# Patient Record
Sex: FEMALE | Race: WHITE | Employment: OTHER | URBAN - METROPOLITAN AREA
[De-identification: names, ages, dates, MRNs, and addresses within clinical notes are randomized per-mention and may not be internally consistent; named-entity substitution may affect disease eponyms.]

---

## 2017-03-09 ENCOUNTER — OFFICE VISIT (OUTPATIENT)
Dept: DERMATOLOGY | Facility: CLINIC | Age: 57
End: 2017-03-09

## 2017-03-09 DIAGNOSIS — L82.0 INFLAMED SEBORRHEIC KERATOSIS: ICD-10-CM

## 2017-03-09 DIAGNOSIS — L60.3 NAIL DYSTROPHY: Primary | ICD-10-CM

## 2017-03-09 DIAGNOSIS — Z12.83 SKIN CANCER SCREENING: ICD-10-CM

## 2017-03-09 DIAGNOSIS — D18.01 CHERRY ANGIOMA: ICD-10-CM

## 2017-03-09 ASSESSMENT — PAIN SCALES - GENERAL: PAINLEVEL: NO PAIN (0)

## 2017-03-09 NOTE — PATIENT INSTRUCTIONS
Cryotherapy    What is it?    Use of a very cold liquid, such as liquid nitrogen, to freeze and destroy abnormal skin cells that need to be removed    What should I expect?    Tenderness and redness    A small blister that might grow and fill with dark purple blood. There may be crusting.    More than one treatment may be needed if the lesions do not go away.    How do I care for the treated area?    Gently wash the area with your hands when bathing.    Use a thin layer of Vaseline to help with healing. You may use a Band-Aid.     The area should heal within 7-10 days and may leave behind a pink or lighter color.     Do not use an antibiotic or Neosporin ointment.     You may take acetaminophen (Tylenol) for pain.     Call your Doctor if you have:    Severe pain    Signs of infection (warmth, redness, cloudy yellow drainage, and or a bad smell)    Questions or concerns    Who should I call with questions?       [facility]: [phone]       [facility]: [phone]       For urgent needs outside of business hours call the [facility] at [phone]        and ask for the dermatology resident on call

## 2017-03-09 NOTE — PROGRESS NOTES
"[facility] Dermatology Note      Dermatology Problem List:  1. Skin cancer screening 3/9/17    CC:   Chief Complaint   Patient presents with     Skin Check     Torie is here for a skin check today. Has a mole by her right ear states \"it has been scabbing over nad getting crusty\". Patient denies any bleeding         Encounter Date: Mar 9, 2017    History of Present Illness:  Ms. Torie Zarco is a 56 year old female who presents as a new patient in self referral for a skin cancer screening. She states she has stayed out of the sun most of her life. She used to get \"heat rash\" from the sun, so this kept her indoors. Today, she is concerned about a flaky itchy spot near her right ear that has been there for 2-3 months. It is not sore and does not bleed. She is also concerned about thick toenails. She wonders if there is anything to do for this or if it is from her shoes. She is feeling well otherwise, without other skin concerns.       Past Medical History:   Patient Active Problem List   Diagnosis     Menopausal syndrome (hot flashes)     Multiple joint pain     Hormone replacement therapy (HRT)     Major depressive disorder, recurrent, in full remission (H)     Generalized anxiety disorder     Past Medical History   Diagnosis Date     Acid reflux      BALBIR (generalised anxiety disorder)      Gestational diabetes      MDD (major depressive disorder), recurrent, in full remission (H)      PONV (postoperative nausea and vomiting)      Past Surgical History   Procedure Laterality Date     As hysteros w permanent fallopain implant  2009     Bunionectomy  2012     left     Colonoscopy  2007     normal     Hand surgery  2004     right hand fx of ring finger      Gyn surgery       Essure procedure     Orthopedic surgery       hand surgery right     Orthopedic surgery       bunionectomy left foot     Dilation and curettage, hysteroscopy diagnostic, combined N/A 10/26/2016     Procedure: COMBINED DILATION AND " CURETTAGE, HYSTEROSCOPY DIAGNOSTIC;  Surgeon: Zainab Freire MD;  Location: [facility] OR       Social History:  The patient used to be be a professor, 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  The patient denies use of tanning beds.    Family History:  There is a family history skin cancer in the patient's father, on his face.  Family history of rheumatoid arthritis.     Medications:  Current Outpatient Prescriptions   Medication Sig Dispense Refill     ibuprofen (ADVIL,MOTRIN) 600 MG tablet Take 1 tablet (600 mg) by mouth every 6 hours as needed for pain (mild) 30 tablet 0     lamoTRIgine (LAMICTAL) 200 MG tablet Take 300 mg by mouth daily        omeprazole (PRILOSEC) 20 MG capsule Take 20 mg by mouth 2 times daily        Simethicone 125 MG TABS Take 125 mg by mouth as needed        pseudoePHEDrine (SUDAFED) 30 MG tablet Take 30 mg by mouth every 4 hours as needed        diphenhydrAMINE (BENADRYL) 25 MG capsule Take 50 mg by mouth At Bedtime        Multiple Vitamins-Minerals (CENTRUM SILVER) per tablet Take 1 tablet by mouth       No Known Allergies      Review of Systems:  -Skin/Heme New Pt: The patient denies frequent sun exposure. The patient denies excessive scarring or problems healing except as per HPI. The patient denies excessive bleeding.  -Constitutional: The patient denies fatigue, fevers, chills, unintended weight loss, and night sweats.  -HEENT: Patient denies nonhealing oral sores.  -Skin: As above in HPI. No additional skin concerns.    Physical exam:  Vitals: LMP 09/15/2016 (Approximate)  GEN: This is a well developed, well-nourished female in no acute distress, in a pleasant mood.    SKIN: Full skin, which includes the head/face, both arms, chest, back, abdomen,both legs, genitalia and/or groin buttocks, digits and/or nails, was examined.  There is a 3 mm tan stuck on papule on an erythematous base on the right preauricular cheek.   There are hypertrophic nail plates noted to most toes. Mild  subungual debris.   -There are bright red some shaped papules scattered on the trunk.   -Few regular brown pigmented macules and papules are identified on the trunk.   -No other lesions of concern on areas examined.     Impression/Plan:  1. Seborrheic keratosis, inflammed    Cryotherapy procedure note: After verbal consent and discussion of risks and benefits including but no limited to dyspigmentation/scar, blister, and pain, one was treated with 1-2mm freeze border for 2 cycles with liquid nitrogen. Post cryotherapy instructions were provided.     Return if this has not resolved or worsening.     2. Nail dystrophy,  Nail culture and nail clipping sent for pathology.   Will notify patient with results.       3. Cherry angioma(s)    Benign nature was discussed. No further intervention required at this time.       4. Few clinically benign nevi on the trunk     ABCDs of melanoma were discussed and self skin checks were advised.      Sun precaution was advised including the use of sun screens of SPF 30 or higher, sun protective clothing, and avoidance of tanning beds.        Staff Involved:  Staff Only    All risks, benefits and alternatives were discussed with patient.  Patient is in agreement and understands the assessment and plan.  All questions were answered.  Sun Screen Education was given.   Return to Clinic as needed per nail clipping otherwise a skin check every 2-3 years, sooner per concerns.  Krystina Gallardo PA-C

## 2017-03-09 NOTE — MR AVS SNAPSHOT
After Visit Summary   3/9/2017    Torie Zarco    MRN: [id number]           Patient Information     Date Of Birth          1960        Visit Information        Provider Department      3/9/2017 1:30 PM Krystina Gallardo PA-C [facility] Dermatology        Today's Diagnoses     Nail dystrophy    -  1      Care Instructions    Cryotherapy    What is it?    Use of a very cold liquid, such as liquid nitrogen, to freeze and destroy abnormal skin cells that need to be removed    What should I expect?    Tenderness and redness    A small blister that might grow and fill with dark purple blood. There may be crusting.    More than one treatment may be needed if the lesions do not go away.    How do I care for the treated area?    Gently wash the area with your hands when bathing.    Use a thin layer of Vaseline to help with healing. You may use a Band-Aid.     The area should heal within 7-10 days and may leave behind a pink or lighter color.     Do not use an antibiotic or Neosporin ointment.     You may take acetaminophen (Tylenol) for pain.     Call your Doctor if you have:    Severe pain    Signs of infection (warmth, redness, cloudy yellow drainage, and or a bad smell)    Questions or concerns    Who should I call with questions?       [facility]: [phone]       [facility]: [phone]       For urgent needs outside of business hours call the [facility] at [phone]        and ask for the dermatology resident on call          Follow-ups after your visit        Who to contact     Please call your clinic at [phone] to:    Ask questions about your health    Make or cancel appointments    Discuss your medicines    Learn about your test results    Speak to your doctor   If you have compliments or concerns about an experience at your clinic, or if you wish to file a complaint, please contact [facility]  Physicians Patient Relations at [phone] or email us at [email or web address]         Additional Information About Your Visit        [employer] Information     [employer] gives you secure access to your electronic health record. If you see a primary care provider, you can also send messages to your care team and make appointments. If you have questions, please call your primary care clinic.  If you do not have a primary care provider, please call [phone] and they will assist you.      [employer] is an electronic gateway that provides easy, online access to your medical records. With [employer], you can request a clinic appointment, read your test results, renew a prescription or communicate with your care team.     To access your existing account, please contact your [facility] or call [phone] for assistance.        Care EveryWhere ID     This is your Care EveryWhere ID. This could be used by other organizations to access your [place] medical records  [id number]        Your Vitals Were     Last Period                   09/15/2016 (Approximate)            Blood Pressure from Last 3 Encounters:   11/16/16 116/74   10/26/16 111/56   10/24/16 129/81    Weight from Last 3 Encounters:   11/16/16 79.4 kg (175 lb)   10/26/16 80.1 kg (176 lb 9.4 oz)   10/24/16 78.9 kg (174 lb)              We Performed the Following     Fungus skin hair nail culture     Surgical pathology exam        Primary Care Provider Office Phone # Fax #    Anna Ospina MD PhD [phone]        PHYSICIANS [address] [address]        Thank you!     Thank you for choosing [facility] DERMATOLOGY  for your care. Our goal is always to provide you with excellent care. Hearing back from our patients is one way we can continue to improve our services. Please take a few minutes to complete the written survey that you may receive in the mail after your visit with us.  Thank you!             Your Updated Medication List - Protect others around you: Learn how to safely use, store and throw away your medicines at www.disposemymeds.org.          This list is accurate as of: 3/9/17  2:23 PM.  Always use your most recent med list.                   Brand Name Dispense Instructions for use    CENTRUM SILVER per tablet      Take 1 tablet by mouth       diphenhydrAMINE 25 MG capsule    BENADRYL     Take 50 mg by mouth At Bedtime       ibuprofen 600 MG tablet    ADVIL/MOTRIN    30 tablet    Take 1 tablet (600 mg) by mouth every 6 hours as needed for pain (mild)       lamoTRIgine 200 MG tablet    LaMICtal     Take 300 mg by mouth daily       omeprazole 20 MG CR capsule    priLOSEC     Take 20 mg by mouth 2 times daily       pseudoePHEDrine 30 MG tablet    SUDAFED     Take 30 mg by mouth every 4 hours as needed       Simethicone 125 MG Tabs      Take 125 mg by mouth as needed

## 2017-03-09 NOTE — NURSING NOTE
"Chief Complaint   Patient presents with     Skin Check     Torie is here for a skin check today. Has a mole by her right ear states \"it has been scabbing over nad getting crusty\". Patient denies any bleeding     Vickey Vaca LPN    "

## 2017-03-09 NOTE — LETTER
"3/9/2017       RE: Torie Zarco  [address]   [place] [address]     Dear Colleague,    Thank you for referring your patient, Torie Zarco, to the [facility] DERMATOLOGY at [facility]. Please see a copy of my visit note below.    [facility] Dermatology Note      Dermatology Problem List:  1. Skin cancer screening 3/9/17    CC:   Chief Complaint   Patient presents with     Skin Check     Torie is here for a skin check today. Has a mole by her right ear states \"it has been scabbing over nad getting crusty\". Patient denies any bleeding         Encounter Date: Mar 9, 2017    History of Present Illness:  Ms. Torie Zarco is a 56 year old female who presents as a new patient in self referral for a skin cancer screening. She states she has stayed out of the sun most of her life. She used to get \"heat rash\" from the sun, so this kept her indoors. Today, she is concerned about a flaky itchy spot near her right ear that has been there for 2-3 months. It is not sore and does not bleed. She is also concerned about thick toenails. She wonders if there is anything to do for this or if it is from her shoes. She is feeling well otherwise, without other skin concerns.       Past Medical History:   Patient Active Problem List   Diagnosis     Menopausal syndrome (hot flashes)     Multiple joint pain     Hormone replacement therapy (HRT)     Major depressive disorder, recurrent, in full remission (H)     Generalized anxiety disorder     Past Medical History   Diagnosis Date     Acid reflux      BALBIR (generalised anxiety disorder)      Gestational diabetes      MDD (major depressive disorder), recurrent, in full remission (H)      PONV (postoperative nausea and vomiting)      Past Surgical History   Procedure Laterality Date     As hysteros w permanent fallopain implant  2009     Bunionectomy  2012     left     Colonoscopy  2007     normal     Hand surgery  2004     right hand fx " of ring finger      Gyn surgery       Essure procedure     Orthopedic surgery       hand surgery right     Orthopedic surgery       bunionectomy left foot     Dilation and curettage, hysteroscopy diagnostic, combined N/A 10/26/2016     Procedure: COMBINED DILATION AND CURETTAGE, HYSTEROSCOPY DIAGNOSTIC;  Surgeon: Zainab Freire MD;  Location: [facility] OR       Social History:  The patient used to be be a professor, 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  The patient denies use of tanning beds.    Family History:  There is a family history skin cancer in the patient's father, on his face.  Family history of rheumatoid arthritis.     Medications:  Current Outpatient Prescriptions   Medication Sig Dispense Refill     ibuprofen (ADVIL,MOTRIN) 600 MG tablet Take 1 tablet (600 mg) by mouth every 6 hours as needed for pain (mild) 30 tablet 0     lamoTRIgine (LAMICTAL) 200 MG tablet Take 300 mg by mouth daily        omeprazole (PRILOSEC) 20 MG capsule Take 20 mg by mouth 2 times daily        Simethicone 125 MG TABS Take 125 mg by mouth as needed        pseudoePHEDrine (SUDAFED) 30 MG tablet Take 30 mg by mouth every 4 hours as needed        diphenhydrAMINE (BENADRYL) 25 MG capsule Take 50 mg by mouth At Bedtime        Multiple Vitamins-Minerals (CENTRUM SILVER) per tablet Take 1 tablet by mouth       No Known Allergies      Review of Systems:  -Skin/Heme New Pt: The patient denies frequent sun exposure. The patient denies excessive scarring or problems healing except as per HPI. The patient denies excessive bleeding.  -Constitutional: The patient denies fatigue, fevers, chills, unintended weight loss, and night sweats.  -HEENT: Patient denies nonhealing oral sores.  -Skin: As above in HPI. No additional skin concerns.    Physical exam:  Vitals: LMP 09/15/2016 (Approximate)  GEN: This is a well developed, well-nourished female in no acute distress, in a pleasant mood.    SKIN: Full skin, which includes the head/face,  both arms, chest, back, abdomen,both legs, genitalia and/or groin buttocks, digits and/or nails, was examined.  There is a 3 mm tan stuck on papule on an erythematous base on the right preauricular cheek.   There are hypertrophic nail plates noted to most toes. Mild subungual debris.   -There are bright red some shaped papules scattered on the trunk.   -Few regular brown pigmented macules and papules are identified on the trunk.   -No other lesions of concern on areas examined.     Impression/Plan:  1. Seborrheic keratosis, inflammed    Cryotherapy procedure note: After verbal consent and discussion of risks and benefits including but no limited to dyspigmentation/scar, blister, and pain, one was treated with 1-2mm freeze border for 2 cycles with liquid nitrogen. Post cryotherapy instructions were provided.     Return if this has not resolved or worsening.     2. Nail dystrophy,  Nail culture and nail clipping sent for pathology.   Will notify patient with results.       3. Cherry angioma(s)    Benign nature was discussed. No further intervention required at this time.       4. Few clinically benign nevi on the trunk     ABCDs of melanoma were discussed and self skin checks were advised.      Sun precaution was advised including the use of sun screens of SPF 30 or higher, sun protective clothing, and avoidance of tanning beds.        Staff Involved:  Staff Only    All risks, benefits and alternatives were discussed with patient.  Patient is in agreement and understands the assessment and plan.  All questions were answered.  Sun Screen Education was given.   Return to Clinic as needed per nail clipping otherwise a skin check every 2-3 years, sooner per concerns.  Krystina Gallardo PA-C

## 2017-03-13 LAB — COPATH REPORT: NORMAL

## 2017-04-06 LAB
BACTERIA SPEC CULT: NORMAL
MICRO REPORT STATUS: NORMAL
SPECIMEN SOURCE: NORMAL

## 2018-05-30 ASSESSMENT — ENCOUNTER SYMPTOMS
MYALGIAS: 1
TASTE DISTURBANCE: 0
WEIGHT LOSS: 0
FATIGUE: 1
HALLUCINATIONS: 0
NECK PAIN: 1
WEIGHT GAIN: 1
SNORES LOUDLY: 0
PANIC: 0
POLYPHAGIA: 0
FEVER: 0
POSTURAL DYSPNEA: 0
BACK PAIN: 1
MUSCLE WEAKNESS: 0
TROUBLE SWALLOWING: 0
MUSCLE CRAMPS: 0
SINUS CONGESTION: 1
NECK MASS: 0
ALTERED TEMPERATURE REGULATION: 1
DYSPNEA ON EXERTION: 0
STIFFNESS: 1
COUGH DISTURBING SLEEP: 1
DECREASED CONCENTRATION: 0
SORE THROAT: 0
DECREASED APPETITE: 0
DEPRESSION: 0
JOINT SWELLING: 1
NIGHT SWEATS: 1
SPUTUM PRODUCTION: 1
SMELL DISTURBANCE: 0
CHILLS: 0
SINUS PAIN: 1
NERVOUS/ANXIOUS: 0
INSOMNIA: 1
ARTHRALGIAS: 1
COUGH: 1
INCREASED ENERGY: 0
WHEEZING: 0
HEMOPTYSIS: 0
HOARSE VOICE: 1
SHORTNESS OF BREATH: 0
POLYDIPSIA: 0

## 2018-05-30 ASSESSMENT — ANXIETY QUESTIONNAIRES
4. TROUBLE RELAXING: SEVERAL DAYS
1. FEELING NERVOUS, ANXIOUS, OR ON EDGE: SEVERAL DAYS
6. BECOMING EASILY ANNOYED OR IRRITABLE: NOT AT ALL
3. WORRYING TOO MUCH ABOUT DIFFERENT THINGS: NOT AT ALL
7. FEELING AFRAID AS IF SOMETHING AWFUL MIGHT HAPPEN: NOT AT ALL
7. FEELING AFRAID AS IF SOMETHING AWFUL MIGHT HAPPEN: NOT AT ALL
GAD7 TOTAL SCORE: 2
GAD7 TOTAL SCORE: 2
5. BEING SO RESTLESS THAT IT IS HARD TO SIT STILL: NOT AT ALL
2. NOT BEING ABLE TO STOP OR CONTROL WORRYING: NOT AT ALL

## 2018-05-31 ASSESSMENT — ANXIETY QUESTIONNAIRES: GAD7 TOTAL SCORE: 2

## 2018-06-06 ENCOUNTER — OFFICE VISIT (OUTPATIENT)
Dept: INTERNAL MEDICINE | Facility: CLINIC | Age: 58
End: 2018-06-06
Attending: INTERNAL MEDICINE
Payer: COMMERCIAL

## 2018-06-06 VITALS
HEART RATE: 112 BPM | HEIGHT: 65 IN | DIASTOLIC BLOOD PRESSURE: 85 MMHG | SYSTOLIC BLOOD PRESSURE: 126 MMHG | BODY MASS INDEX: 29.82 KG/M2 | WEIGHT: 179 LBS

## 2018-06-06 DIAGNOSIS — M19.041 PRIMARY OSTEOARTHRITIS OF BOTH HANDS: Primary | ICD-10-CM

## 2018-06-06 DIAGNOSIS — M19.042 PRIMARY OSTEOARTHRITIS OF BOTH HANDS: Primary | ICD-10-CM

## 2018-06-06 DIAGNOSIS — Z29.9 PREVENTIVE MEASURE: ICD-10-CM

## 2018-06-06 DIAGNOSIS — N95.1 MENOPAUSAL SYNDROME (HOT FLASHES): ICD-10-CM

## 2018-06-06 DIAGNOSIS — F33.42 MAJOR DEPRESSIVE DISORDER, RECURRENT, IN FULL REMISSION (H): ICD-10-CM

## 2018-06-06 DIAGNOSIS — K21.9 GERD WITHOUT ESOPHAGITIS: ICD-10-CM

## 2018-06-06 PROCEDURE — G0463 HOSPITAL OUTPT CLINIC VISIT: HCPCS | Mod: ZF

## 2018-06-06 ASSESSMENT — ENCOUNTER SYMPTOMS
MEMORY LOSS: 0
DECREASED APPETITE: 0
SMELL DISTURBANCE: 0
LOSS OF CONSCIOUSNESS: 0
HYPERTENSION: 0
HOARSE VOICE: 1
NAIL CHANGES: 0
POLYDIPSIA: 0
MUSCLE CRAMPS: 0
EYE PAIN: 0
HEMOPTYSIS: 0
TROUBLE SWALLOWING: 0
HEADACHES: 0
DIARRHEA: 0
DYSPNEA ON EXERTION: 0
STIFFNESS: 1
PALPITATIONS: 0
RECTAL PAIN: 0
SPEECH CHANGE: 0
INCREASED ENERGY: 0
MUSCLE WEAKNESS: 0
TREMORS: 0
BLOATING: 0
VOMITING: 0
FATIGUE: 1
EXERCISE INTOLERANCE: 0
SNORES LOUDLY: 0
DISTURBANCES IN COORDINATION: 0
WEAKNESS: 0
INSOMNIA: 1
EYE IRRITATION: 0
SEIZURES: 0
TACHYCARDIA: 0
CONSTIPATION: 0
BACK PAIN: 1
COUGH: 1
WEIGHT LOSS: 0
RECTAL BLEEDING: 0
POSTURAL DYSPNEA: 0
NERVOUS/ANXIOUS: 0
ARTHRALGIAS: 1
NAUSEA: 0
BRUISES/BLEEDS EASILY: 0
POOR WOUND HEALING: 0
BOWEL INCONTINENCE: 0
SINUS CONGESTION: 1
HOT FLASHES: 0
LEG SWELLING: 0
DYSURIA: 0
BLOOD IN STOOL: 0
SPUTUM PRODUCTION: 1
BREAST MASS: 0
NECK MASS: 0
MYALGIAS: 1
TASTE DISTURBANCE: 0
HEMATURIA: 0
EYE WATERING: 0
HALLUCINATIONS: 0
SORE THROAT: 0
DEPRESSION: 0
FLANK PAIN: 0
NUMBNESS: 0
TINGLING: 0
NECK PAIN: 1
SINUS PAIN: 1
LEG PAIN: 0
ALTERED TEMPERATURE REGULATION: 1
DIZZINESS: 0
SKIN CHANGES: 0
WHEEZING: 0
JAUNDICE: 0
JOINT SWELLING: 1
SYNCOPE: 0
ABDOMINAL PAIN: 0
HEARTBURN: 0
DECREASED LIBIDO: 0
HYPOTENSION: 0
SHORTNESS OF BREATH: 0
ORTHOPNEA: 0
WEIGHT GAIN: 1
EYE REDNESS: 0
PARALYSIS: 0
NIGHT SWEATS: 1
EXTREMITY NUMBNESS: 0
BREAST PAIN: 0
PANIC: 0
DIFFICULTY URINATING: 0
DECREASED CONCENTRATION: 0
POLYPHAGIA: 0
CLAUDICATION: 0
DOUBLE VISION: 0
LIGHT-HEADEDNESS: 0
SLEEP DISTURBANCES DUE TO BREATHING: 0
FEVER: 0
CHILLS: 0
SWOLLEN GLANDS: 0
COUGH DISTURBING SLEEP: 1

## 2018-06-06 ASSESSMENT — PAIN SCALES - GENERAL: PAINLEVEL: NO PAIN (0)

## 2018-06-06 NOTE — MR AVS SNAPSHOT
After Visit Summary   6/6/2018    Torie Zarco    MRN: [id number]           Patient Information     Date Of Birth          1960        Visit Information        Provider Department      6/6/2018 8:20 AM Rea Andrade MD [facility]         Today's Diagnoses     Primary osteoarthritis of both hands    -  1    Preventive measure        Menopausal syndrome (hot flashes)        Major depressive disorder, recurrent, in full remission (H)        GERD without esophagitis           Follow-ups after your visit        Additional Services     SPORTS MEDICINE REFERRAL       Your provider has referred you to:  [facility]: Sports Medicine Clinic [facility] [phone]   [email or web address]    Please be aware that coverage of these services is subject to the terms and limitations of your health insurance plan.  Call member services at your health plan with any benefit or coverage questions.      Please bring the following to your appointment:    >>   Any x-rays, CTs or MRIs which have been performed.  Contact the facility where they were done to arrange for  prior to your scheduled appointment.    >>   List of current medications   >>   This referral request   >>   Any documents/labs given to you for this referral            WEIGHT MANAGEMENT/ [facility] LIFESTYLE PROGRAM REFERRAL       To schedule an appointment, please call the [facility] Sports Medicine Clinic at  [phone] or [facility] at [phone].                  Future tests that were ordered for you today     Open Future Orders        Priority Expected Expires Ordered    Lipid Profile Routine  6/6/2019 6/6/2018    Comprehensive metabolic panel Routine  6/6/2019 6/6/2018    Hepatitis C Screen Reflex to HCV RNA Quant and Genotype Routine  6/6/2019 6/6/2018    TSH with free T4 reflex Routine  6/6/2019 6/6/2018    Rheumatoid factor Routine  6/6/2019 6/6/2018    RBC Sedimentation Rate  "Routine  6/6/2019 6/6/2018    C-Reactive Protein, Inflammatory Routine  6/6/2019 6/6/2018    XR Hand Bilateral 2 Views Routine  6/6/2019 6/6/2018    SPORTS MEDICINE REFERRAL Routine  6/6/2019 6/6/2018            Who to contact     Please call your clinic at [phone] to:    Ask questions about your health    Make or cancel appointments    Discuss your medicines    Learn about your test results    Speak to your doctor            Additional Information About Your Visit        [employer] Information     [employer] gives you secure access to your electronic health record. If you see a primary care provider, you can also send messages to your care team and make appointments. If you have questions, please call your primary care clinic.  If you do not have a primary care provider, please call [phone] and they will assist you.      [employer] is an electronic gateway that provides easy, online access to your medical records. With [employer], you can request a clinic appointment, read your test results, renew a prescription or communicate with your care team.     To access your existing account, please contact your [facility] or call [phone] for assistance.        Care EveryWhere ID     This is your Care EveryWhere ID. This could be used by other organizations to access your [place] medical records  [id number]        Your Vitals Were     Pulse Height Last Period BMI (Body Mass Index)          112 1.651 m (5' 5\") 09/15/2016 (Approximate) 29.79 kg/m2         Blood Pressure from Last 3 Encounters:   06/06/18 126/85   11/16/16 116/74   10/26/16 111/56    Weight from Last 3 Encounters:   06/06/18 81.2 kg (179 lb)   11/16/16 79.4 kg (175 lb)   10/26/16 80.1 kg (176 lb 9.4 oz)              We Performed the Following     WEIGHT MANAGEMENT/ P LIFESTYLE PROGRAM REFERRAL        Primary Care Provider Office Phone # Fax #    Anna Ospina MD PhD [phone]       [address] [place] [address] " [facility] [address]  [facility] [address]        Equal Access to Services     CHAI SCHULER : Hadii bernabe hinton bety Archer, waaxda luqadaha, qaybta kaalmanoreen sen, gavin louismatthiaslorene dhaliwal. So [facility] [phone].    ATENCIÓN: Si habla español, tiene a zuñiga disposición servicios gratuitos de asistencia lingüística. Beaname al [phone].    We comply with applicable federal civil rights laws and [place] laws. We do not discriminate on the basis of race, color, national origin, age, disability, sex, sexual orientation, or gender identity.            Thank you!     Thank you for choosing [facility]   for your care. Our goal is always to provide you with excellent care. Hearing back from our patients is one way we can continue to improve our services. Please take a few minutes to complete the written survey that you may receive in the mail after your visit with us. Thank you!             Your Updated Medication List - Protect others around you: Learn how to safely use, store and throw away your medicines at www.disposemymeds.org.          This list is accurate as of 6/6/18  9:38 AM.  Always use your most recent med list.                   Brand Name Dispense Instructions for use Diagnosis    CENTRUM SILVER per tablet      Take 1 tablet by mouth        diphenhydrAMINE 25 MG capsule    BENADRYL     Take 50 mg by mouth At Bedtime        ibuprofen 600 MG tablet    ADVIL/MOTRIN    30 tablet    Take 1 tablet (600 mg) by mouth every 6 hours as needed for pain (mild)    S/P D&C (status post dilation and curettage)       lamoTRIgine 200 MG tablet    LaMICtal     Take 300 mg by mouth daily        omeprazole 20 MG CR capsule    priLOSEC     Take 20 mg by mouth 2 times daily        pseudoePHEDrine 30 MG tablet    SUDAFED     Take 30 mg by mouth every 4 hours as needed        Simethicone 125 MG Tabs      Take 125 mg by mouth as needed

## 2018-06-06 NOTE — NURSING NOTE
Chief Complaint   Patient presents with     Establish Care     General check-up   Lotus Jimenez LPN

## 2018-06-06 NOTE — LETTER
6/6/2018       RE: Torie Zarco  [address]  [place] [address]     Dear Colleague,    Thank you for referring your patient, Torie Zarco, to the [facility]  at [facility]. Please see a copy of my visit note below.    HPI  Patient is here to address several concerns. She reports that she has been dealing with arthritis in her hands. She has noticed that the symptoms have been getting worse. She reports family history of rheumatoid arthritis. She has been waking up wit pain in her fingers. She is concerned about osteoarthritis and possible rheumatoid arthritis. She has been waking up with some stiffness in her joints that can last for up to an hour. She is working with her hands a lot, also had injury to her finger. She has a trigger finger as well.   Patient has been diagnosed with laryngeal reflux, wondering if PPI therapy is the optimal approach.   She needs LFT testing because of lamotrigine therapy. She has family history of severe anxiety and depression, has been followed by Psychiatry and Psychology on the [facility] basic.   Patient reports that her  was diagnosed with glioblastoma multiforme. It has increased her stress level considerably.     Review of Systems     Constitutional:  Positive for weight gain, fatigue, night sweats and recent stressors. Negative for fever, chills, weight loss, decreased appetite, height loss, post-operative complications, incisional pain, hallucinations, increased energy, hyperactivity and confused.   HENT:  Positive for tinnitus, hoarse voice, tooth pain, gum tenderness, bleeding gums, dry mouth, sinus pain and sinus congestion. Negative for ear pain, hearing loss, nosebleeds, trouble swallowing, mouth sores, sore throat, ear discharge, taste disturbance, smell disturbance, hearing aid and neck mass.    Eyes:  Negative for double vision, pain, redness, eye pain, decreased vision, eye watering,  eye bulging, eye dryness, flashing lights, spots, floaters, strabismus, tunnel vision, jaundice and eye irritation.   Respiratory:   Positive for cough, sputum production and cough disturbing sleep. Negative for hemoptysis, shortness of breath, wheezing, sleep disturbances due to breathing, snores loudly, dyspnea on exertion and postural dyspnea.    Cardiovascular:  Negative for chest pain, dyspnea on exertion, palpitations, orthopnea, claudication, leg swelling, fingers/toes turn blue, hypertension, hypotension, syncope, history of heart murmur, chest pain on exertion, chest pain at rest, pacemaker, few scattered varicosities, leg pain, sleep disturbances due to breathing, tachycardia, light-headedness, exercise intolerance and edema.   Gastrointestinal:  Negative for heartburn, nausea, vomiting, abdominal pain, diarrhea, constipation, blood in stool, melena, rectal pain, bloating, hemorrhoids, bowel incontinence, jaundice, rectal bleeding, coffee ground emesis and change in stool.   Genitourinary:  Negative for bladder incontinence, dysuria, urgency, hematuria, flank pain, vaginal discharge, difficulty urinating, genital sores, dyspareunia, decreased libido, nocturia, voiding less frequently, arousal difficulty, abnormal vaginal bleeding, excessive menstruation, menstrual changes, hot flashes, vaginal dryness and postmenopausal bleeding.   Musculoskeletal:  Positive for myalgias, back pain, joint swelling, arthralgias, stiffness and neck pain. Negative for muscle cramps, bone pain, muscle weakness and fracture.   Skin:  Negative for nail changes, itching, poor wound healing, rash, hair changes, skin changes, acne, warts, poor wound healing, scarring, flaky skin, Raynaud's phenomenon, sensitivity to sunlight and skin thickening.   Neurological:  Negative for dizziness, tingling, tremors, speech change, seizures, loss of consciousness, weakness, light-headedness, numbness, headaches, disturbances in coordination,  extremity numbness, memory loss, difficulty walking and paralysis.   Endo/Heme:  Negative for anemia, swollen glands and bruises/bleeds easily.   Psychiatric/Behavioral:  Positive for mood swings. Negative for depression, hallucinations, memory loss, decreased concentration and panic attacks.    Breast:  Negative for breast discharge, breast mass, breast pain and nipple retraction.   Endocrine:  Positive for altered temperature regulation.Negative for polyphagia, polydipsia, unwanted hair growth and change in facial hair.    Current Outpatient Prescriptions   Medication     diphenhydrAMINE (BENADRYL) 25 MG capsule     ibuprofen (ADVIL,MOTRIN) 600 MG tablet     lamoTRIgine (LAMICTAL) 200 MG tablet     Multiple Vitamins-Minerals (CENTRUM SILVER) per tablet     omeprazole (PRILOSEC) 20 MG capsule     pseudoePHEDrine (SUDAFED) 30 MG tablet     Simethicone 125 MG TABS     No current facility-administered medications for this visit.      Past Medical History:   Diagnosis Date     Abnormal Pap smear 2013    Unusually looking cells on uterus, but no cancer on exam     Acid reflux      BALBIR (generalised anxiety disorder)      Gestational diabetes      MDD (major depressive disorder), recurrent, in full remission (H)      PONV (postoperative nausea and vomiting)      Past Surgical History:   Procedure Laterality Date     AS HYSTEROS W PERMANENT FALLOPAIN IMPLANT  2009     BIOPSY  2016    Ck unusual bleeding; no issue discovered     BUNIONECTOMY  2012    left     COLONOSCOPY  2007    normal     DILATION AND CURETTAGE, HYSTEROSCOPY DIAGNOSTIC, COMBINED N/A 10/26/2016    Procedure: COMBINED DILATION AND CURETTAGE, HYSTEROSCOPY DIAGNOSTIC;  Surgeon: Zainab Freire MD;  Location: [facility] OR     GYN SURGERY      Essure procedure     HAND SURGERY  2004    right hand fx of ring finger      ORTHOPEDIC SURGERY      hand surgery right     ORTHOPEDIC SURGERY      bunionectomy left foot     Family History   Problem Relation Age of  "Onset     Anxiety Disorder Mother      OSTEOPOROSIS Mother      Thyroid Disease Father      CEREBROVASCULAR DISEASE Other      TIA and PE     Colon Cancer Paternal Grandmother      1960s - full remission     Coronary Artery Disease Son      CHD - unrepaired aortic stenosis     Coronary Artery Disease Maternal Grandmother      Heart failure - cause of death     Depression Sister      suicide - 1985     Social History     Social History     Marital status:      Spouse name: N/A     Number of children: N/A     Years of education: N/A     Occupational History     Not on file.     Social History Main Topics     Smoking status: Never Smoker     Smokeless tobacco: Never Used     Alcohol use 0.0 oz/week      Comment: light - typically 5-7x/wk (glass of wine in any day)     Drug use: No     Sexual activity: Yes     Partners: Male     Birth control/ protection: Other     Other Topics Concern     Caffeine Concern Yes     1/2 c/day     Sleep Concern Yes     Stress Concern Yes     Exercise Yes     walk daily 3 miles - 5miles     Bike Helmet Yes     Seat Belt Yes     Social History Narrative    PhD in English, recently finished  MS in [place] and preservation     2 childrens (boys).  .  [employer]  - second       Vitals:    06/06/18 0834 06/06/18 0838 06/06/18 0839   BP: 135/81 132/82 126/85   Pulse: 92 90 112   Weight: 81.2 kg (179 lb)     Height: 1.651 m (5' 5\")         Physical Exam   Constitutional: She is oriented to person, place, and time and well-developed, well-nourished, and in no distress.   HENT:   Head: Normocephalic and atraumatic.   Mouth/Throat: Oropharynx is clear and moist.   Eyes: Conjunctivae are normal.   Neck: Normal range of motion. Neck supple.   Cardiovascular: Normal rate.    Pulmonary/Chest: Effort normal.   Musculoskeletal: She exhibits deformity (mild hypertrophy of the DIP joints - bilateral hands. No active synovitis present). She exhibits no edema or tenderness. "   Neurological: She is alert and oriented to person, place, and time.   Skin: Skin is warm and dry.   Psychiatric: Mood, memory, affect and judgment normal.   Vitals reviewed.        Answers for HPI/ROS submitted by the patient on 5/30/2018   BALBIR 7 TOTAL SCORE: 2  PHQ-2 Score: 0    Assessment and plan:  Torie was seen today for establish care.    Diagnoses and all orders for this visit:    Primary osteoarthritis of both hands.  Reviewed symptoms last physical exam findings with the patient. .   Strongly favor osteoarthritis as the most likely etiology, however, given patient history of autoimmune disease in the family, will evaluate for rheumatoid arthritis. Will check rheumatoid factor as well as ESR and CRP.  Patient will also schedule an x-ray to exclude erosive lesions. Referral to sports medicine for further evaluation was given to the patient   -     Rheumatoid factor; Future  -     RBC Sedimentation Rate; Future  -     C-Reactive Protein, Inflammatory; Future  -     XR Hand Bilateral 2 Views; Future  -     SPORTS MEDICINE REFERRAL; Future    Preventive measure.  Reviewed preventive healthcare needs with patient.  Will screen for hepatitis C as well as hyperlipidemia.  Will check fasting lipid panel as well as fasting blood glucose patient has history of gestational diabetes.  Patient will be advised on test results accordingly.  -     Lipid Profile; Future  -     Comprehensive metabolic panel; Future  -     Hepatitis C Screen Reflex to HCV RNA Quant and Genotype; Future  -     TSH with free T4 reflex; Future    Menopausal syndrome (hot flashes).  Patient reports that she does not want to restart hormone replacement therapy.  Discussed lifestyle modifications aimed at management of menopausal symptoms.  Patient will contact the clinic if her symptoms are bothersome enough to change medication.    Major depressive disorder, recurrent, in full remission (H).  Patient is closely followed by psychology as well as  psychiatry.  No medication changes were recommended today.  Discussed lifestyle modifications aimed at control of reflux.  Recommend weight loss, patient was referred to lifestyle modification program    GERD without esophagitis..  Discussed weight loss and some optimal approach to management of reflux.  Patient was referred to weight management for recommendations on lifestyle modifications aimed at weight loss.  -     WEIGHT MANAGEMENT/ P LIFESTYLE PROGRAM REFERRAL    Total time spent 45 minutes.  More than 50% of the time spent with Ms. Zarco on counseling / coordinating her care    Rea Andrade MD

## 2018-06-06 NOTE — PROGRESS NOTES
HPI  Patient is here to address several concerns. She reports that she has been dealing with arthritis in her hands. She has noticed that the symptoms have been getting worse. She reports family history of rheumatoid arthritis. She has been waking up wit pain in her fingers. She is concerned about osteoarthritis and possible rheumatoid arthritis. She has been waking up with some stiffness in her joints that can last for up to an hour. She is working with her hands a lot, also had injury to her finger. She has a trigger finger as well.   Patient has been diagnosed with laryngeal reflux, wondering if PPI therapy is the optimal approach.   She needs LFT testing because of lamotrigine therapy. She has family history of severe anxiety and depression, has been followed by Psychiatry and Psychology on the [facility].   Patient reports that her  was diagnosed with glioblastoma multiforme. It has increased her stress level considerably.     Review of Systems     Constitutional:  Positive for weight gain, fatigue, night sweats and recent stressors. Negative for fever, chills, weight loss, decreased appetite, height loss, post-operative complications, incisional pain, hallucinations, increased energy, hyperactivity and confused.   HENT:  Positive for tinnitus, hoarse voice, tooth pain, gum tenderness, bleeding gums, dry mouth, sinus pain and sinus congestion. Negative for ear pain, hearing loss, nosebleeds, trouble swallowing, mouth sores, sore throat, ear discharge, taste disturbance, smell disturbance, hearing aid and neck mass.    Eyes:  Negative for double vision, pain, redness, eye pain, decreased vision, eye watering, eye bulging, eye dryness, flashing lights, spots, floaters, strabismus, tunnel vision, jaundice and eye irritation.   Respiratory:   Positive for cough, sputum production and cough disturbing sleep. Negative for hemoptysis, shortness of breath, wheezing, sleep disturbances due to breathing, snores  loudly, dyspnea on exertion and postural dyspnea.    Cardiovascular:  Negative for chest pain, dyspnea on exertion, palpitations, orthopnea, claudication, leg swelling, fingers/toes turn blue, hypertension, hypotension, syncope, history of heart murmur, chest pain on exertion, chest pain at rest, pacemaker, few scattered varicosities, leg pain, sleep disturbances due to breathing, tachycardia, light-headedness, exercise intolerance and edema.   Gastrointestinal:  Negative for heartburn, nausea, vomiting, abdominal pain, diarrhea, constipation, blood in stool, melena, rectal pain, bloating, hemorrhoids, bowel incontinence, jaundice, rectal bleeding, coffee ground emesis and change in stool.   Genitourinary:  Negative for bladder incontinence, dysuria, urgency, hematuria, flank pain, vaginal discharge, difficulty urinating, genital sores, dyspareunia, decreased libido, nocturia, voiding less frequently, arousal difficulty, abnormal vaginal bleeding, excessive menstruation, menstrual changes, hot flashes, vaginal dryness and postmenopausal bleeding.   Musculoskeletal:  Positive for myalgias, back pain, joint swelling, arthralgias, stiffness and neck pain. Negative for muscle cramps, bone pain, muscle weakness and fracture.   Skin:  Negative for nail changes, itching, poor wound healing, rash, hair changes, skin changes, acne, warts, poor wound healing, scarring, flaky skin, Raynaud's phenomenon, sensitivity to sunlight and skin thickening.   Neurological:  Negative for dizziness, tingling, tremors, speech change, seizures, loss of consciousness, weakness, light-headedness, numbness, headaches, disturbances in coordination, extremity numbness, memory loss, difficulty walking and paralysis.   Endo/Heme:  Negative for anemia, swollen glands and bruises/bleeds easily.   Psychiatric/Behavioral:  Positive for mood swings. Negative for depression, hallucinations, memory loss, decreased concentration and panic attacks.     Breast:  Negative for breast discharge, breast mass, breast pain and nipple retraction.   Endocrine:  Positive for altered temperature regulation.Negative for polyphagia, polydipsia, unwanted hair growth and change in facial hair.    Current Outpatient Prescriptions   Medication     diphenhydrAMINE (BENADRYL) 25 MG capsule     ibuprofen (ADVIL,MOTRIN) 600 MG tablet     lamoTRIgine (LAMICTAL) 200 MG tablet     Multiple Vitamins-Minerals (CENTRUM SILVER) per tablet     omeprazole (PRILOSEC) 20 MG capsule     pseudoePHEDrine (SUDAFED) 30 MG tablet     Simethicone 125 MG TABS     No current facility-administered medications for this visit.      Past Medical History:   Diagnosis Date     Abnormal Pap smear 2013    Unusually looking cells on uterus, but no cancer on exam     Acid reflux      BALBIR (generalised anxiety disorder)      Gestational diabetes      MDD (major depressive disorder), recurrent, in full remission (H)      PONV (postoperative nausea and vomiting)      Past Surgical History:   Procedure Laterality Date     AS HYSTEROS W PERMANENT FALLOPAIN IMPLANT  2009     BIOPSY  2016    Ck unusual bleeding; no issue discovered     BUNIONECTOMY  2012    left     COLONOSCOPY  2007    normal     DILATION AND CURETTAGE, HYSTEROSCOPY DIAGNOSTIC, COMBINED N/A 10/26/2016    Procedure: COMBINED DILATION AND CURETTAGE, HYSTEROSCOPY DIAGNOSTIC;  Surgeon: Zainab Freire MD;  Location: [facility] OR     GYN SURGERY      Essure procedure     HAND SURGERY  2004    right hand fx of ring finger      ORTHOPEDIC SURGERY      hand surgery right     ORTHOPEDIC SURGERY      bunionectomy left foot     Family History   Problem Relation Age of Onset     Anxiety Disorder Mother      OSTEOPOROSIS Mother      Thyroid Disease Father      CEREBROVASCULAR DISEASE Other      TIA and PE     Colon Cancer Paternal Grandmother      1960s - full remission     Coronary Artery Disease Son      CHD - unrepaired aortic stenosis     Coronary Artery  "Disease Maternal Grandmother      Heart failure - cause of death     Depression Sister      suicide - 1985     Social History     Social History     Marital status:      Spouse name: N/A     Number of children: N/A     Years of education: N/A     Occupational History     Not on file.     Social History Main Topics     Smoking status: Never Smoker     Smokeless tobacco: Never Used     Alcohol use 0.0 oz/week      Comment: light - typically 5-7x/wk (glass of wine in any day)     Drug use: No     Sexual activity: Yes     Partners: Male     Birth control/ protection: Other     Other Topics Concern     Caffeine Concern Yes     1/2 c/day     Sleep Concern Yes     Stress Concern Yes     Exercise Yes     walk daily 3 miles - 5miles     Bike Helmet Yes     Seat Belt Yes     Social History Narrative    PhD in English, recently finished  MS in [place] and preservation     2 childrens (boys).  .  [employer]  - second       Vitals:    06/06/18 0834 06/06/18 0838 06/06/18 0839   BP: 135/81 132/82 126/85   Pulse: 92 90 112   Weight: 81.2 kg (179 lb)     Height: 1.651 m (5' 5\")         Physical Exam   Constitutional: She is oriented to person, place, and time and well-developed, well-nourished, and in no distress.   HENT:   Head: Normocephalic and atraumatic.   Mouth/Throat: Oropharynx is clear and moist.   Eyes: Conjunctivae are normal.   Neck: Normal range of motion. Neck supple.   Cardiovascular: Normal rate.    Pulmonary/Chest: Effort normal.   Musculoskeletal: She exhibits deformity (mild hypertrophy of the DIP joints - bilateral hands. No active synovitis present). She exhibits no edema or tenderness.   Neurological: She is alert and oriented to person, place, and time.   Skin: Skin is warm and dry.   Psychiatric: Mood, memory, affect and judgment normal.   Vitals reviewed.        Answers for HPI/ROS submitted by the patient on 5/30/2018   BALBIR 7 TOTAL SCORE: 2  PHQ-2 Score: 0    Assessment " and plan:  Torie was seen today for establish care.    Diagnoses and all orders for this visit:    Primary osteoarthritis of both hands.  Reviewed symptoms last physical exam findings with the patient. .   Strongly favor osteoarthritis as the most likely etiology, however, given patient history of autoimmune disease in the family, will evaluate for rheumatoid arthritis. Will check rheumatoid factor as well as ESR and CRP.  Patient will also schedule an x-ray to exclude erosive lesions. Referral to sports medicine for further evaluation was given to the patient   -     Rheumatoid factor; Future  -     RBC Sedimentation Rate; Future  -     C-Reactive Protein, Inflammatory; Future  -     XR Hand Bilateral 2 Views; Future  -     SPORTS MEDICINE REFERRAL; Future    Preventive measure.  Reviewed preventive healthcare needs with patient.  Will screen for hepatitis C as well as hyperlipidemia.  Will check fasting lipid panel as well as fasting blood glucose patient has history of gestational diabetes.  Patient will be advised on test results accordingly.  -     Lipid Profile; Future  -     Comprehensive metabolic panel; Future  -     Hepatitis C Screen Reflex to HCV RNA Quant and Genotype; Future  -     TSH with free T4 reflex; Future    Menopausal syndrome (hot flashes).  Patient reports that she does not want to restart hormone replacement therapy.  Discussed lifestyle modifications aimed at management of menopausal symptoms.  Patient will contact the clinic if her symptoms are bothersome enough to change medication.    Major depressive disorder, recurrent, in full remission (H).  Patient is closely followed by psychology as well as psychiatry.  No medication changes were recommended today.  Discussed lifestyle modifications aimed at control of reflux.  Recommend weight loss, patient was referred to lifestyle modification program    GERD without esophagitis..  Discussed weight loss and some optimal approach to management  of reflux.  Patient was referred to weight management for recommendations on lifestyle modifications aimed at weight loss.  -     WEIGHT MANAGEMENT/ P LIFESTYLE PROGRAM REFERRAL    Total time spent 45 minutes.  More than 50% of the time spent with Ms. Zarco on counseling / coordinating her care    Rea Andrade MD

## 2018-06-18 DIAGNOSIS — Z29.9 PREVENTIVE MEASURE: ICD-10-CM

## 2018-06-18 DIAGNOSIS — M19.042 PRIMARY OSTEOARTHRITIS OF BOTH HANDS: ICD-10-CM

## 2018-06-18 DIAGNOSIS — M19.041 PRIMARY OSTEOARTHRITIS OF BOTH HANDS: ICD-10-CM

## 2018-06-18 LAB
ALBUMIN SERPL-MCNC: 3.8 G/DL (ref 3.4–5)
ALP SERPL-CCNC: 84 U/L (ref 40–150)
ALT SERPL W P-5'-P-CCNC: 28 U/L (ref 0–50)
ANION GAP SERPL CALCULATED.3IONS-SCNC: 8 MMOL/L (ref 3–14)
AST SERPL W P-5'-P-CCNC: 18 U/L (ref 0–45)
BILIRUB SERPL-MCNC: 0.3 MG/DL (ref 0.2–1.3)
BUN SERPL-MCNC: 16 MG/DL (ref 7–30)
CALCIUM SERPL-MCNC: 8.3 MG/DL (ref 8.5–10.1)
CHLORIDE SERPL-SCNC: 108 MMOL/L (ref 94–109)
CHOLEST SERPL-MCNC: 195 MG/DL
CO2 SERPL-SCNC: 25 MMOL/L (ref 20–32)
CREAT SERPL-MCNC: 0.84 MG/DL (ref 0.52–1.04)
CRP SERPL-MCNC: <2.9 MG/L (ref 0–8)
ERYTHROCYTE [SEDIMENTATION RATE] IN BLOOD BY WESTERGREN METHOD: 6 MM/H (ref 0–30)
GFR SERPL CREATININE-BSD FRML MDRD: 70 ML/MIN/1.7M2
GLUCOSE SERPL-MCNC: 90 MG/DL (ref 70–99)
HCV AB SERPL QL IA: NONREACTIVE
HDLC SERPL-MCNC: 62 MG/DL
LDLC SERPL CALC-MCNC: 117 MG/DL
NONHDLC SERPL-MCNC: 133 MG/DL
POTASSIUM SERPL-SCNC: 4.1 MMOL/L (ref 3.4–5.3)
PROT SERPL-MCNC: 6.8 G/DL (ref 6.8–8.8)
RHEUMATOID FACT SER NEPH-ACNC: <20 IU/ML (ref 0–20)
SODIUM SERPL-SCNC: 141 MMOL/L (ref 133–144)
TRIGL SERPL-MCNC: 79 MG/DL
TSH SERPL DL<=0.005 MIU/L-ACNC: 3.3 MU/L (ref 0.4–4)

## 2018-06-18 NOTE — TELEPHONE ENCOUNTER
FUTURE VISIT INFORMATION      FUTURE VISIT INFORMATION:    Date: 6/20/18    Time: 5:45    Location:   REFERRAL INFORMATION:    Referring provider: Lupe Tinajero                          Referring providers clinic: [facility]                           Reason for visit/diagnosis: Primary osteoarthritis of both hands        RECORDS STATUS    All Records and Imaging Internal

## 2018-06-20 ENCOUNTER — PRE VISIT (OUTPATIENT)
Dept: ORTHOPEDICS | Facility: CLINIC | Age: 58
End: 2018-06-20

## 2018-06-20 ENCOUNTER — RADIANT APPOINTMENT (OUTPATIENT)
Dept: GENERAL RADIOLOGY | Facility: CLINIC | Age: 58
End: 2018-06-20
Attending: INTERNAL MEDICINE
Payer: COMMERCIAL

## 2018-06-20 ENCOUNTER — OFFICE VISIT (OUTPATIENT)
Dept: ORTHOPEDICS | Facility: CLINIC | Age: 58
End: 2018-06-20
Payer: COMMERCIAL

## 2018-06-20 VITALS — HEIGHT: 65 IN | BODY MASS INDEX: 29.82 KG/M2 | WEIGHT: 179 LBS

## 2018-06-20 DIAGNOSIS — M19.041 PRIMARY OSTEOARTHRITIS OF BOTH HANDS: ICD-10-CM

## 2018-06-20 DIAGNOSIS — M65.342 TRIGGER RING FINGER OF LEFT HAND: Primary | ICD-10-CM

## 2018-06-20 DIAGNOSIS — M79.641 BILATERAL HAND PAIN: ICD-10-CM

## 2018-06-20 DIAGNOSIS — M19.042 PRIMARY OSTEOARTHRITIS OF BOTH HANDS: ICD-10-CM

## 2018-06-20 DIAGNOSIS — M79.641 BILATERAL HAND PAIN: Primary | ICD-10-CM

## 2018-06-20 DIAGNOSIS — M79.642 BILATERAL HAND PAIN: Primary | ICD-10-CM

## 2018-06-20 DIAGNOSIS — M79.642 BILATERAL HAND PAIN: ICD-10-CM

## 2018-06-20 RX ADMIN — DEXAMETHASONE SODIUM PHOSPHATE 2 MG: 4 INJECTION, SOLUTION INTRA-ARTICULAR; INTRALESIONAL; INTRAMUSCULAR; INTRAVENOUS; SOFT TISSUE at 18:11

## 2018-06-20 RX ADMIN — DEXAMETHASONE SODIUM PHOSPHATE 4 MG: 4 INJECTION, SOLUTION INTRA-ARTICULAR; INTRALESIONAL; INTRAMUSCULAR; INTRAVENOUS; SOFT TISSUE at 18:11

## 2018-06-20 RX ADMIN — LIDOCAINE HYDROCHLORIDE 0.5 ML: 10 INJECTION, SOLUTION EPIDURAL; INFILTRATION; INTRACAUDAL; PERINEURAL at 18:11

## 2018-06-20 NOTE — MR AVS SNAPSHOT
"              After Visit Summary   6/20/2018    Torie Zarco    MRN: [id number]           Patient Information     Date Of Birth          1960        Visit Information        Provider Department      6/20/2018 5:45 PM Sandoval Henderson MD [facility]        Today's Diagnoses     Trigger ring finger of left hand    -  1    Primary osteoarthritis of both hands           Follow-ups after your visit        Who to contact     Please call your clinic at [phone] to:    Ask questions about your health    Make or cancel appointments    Discuss your medicines    Learn about your test results    Speak to your doctor            Additional Information About Your Visit        MyChart Information     My Pick Box gives you secure access to your electronic health record. If you see a primary care provider, you can also send messages to your care team and make appointments. If you have questions, please call your primary care clinic.  If you do not have a primary care provider, please call [phone] and they will assist you.      My Pick Box is an electronic gateway that provides easy, online access to your medical records. With My Pick Box, you can request a clinic appointment, read your test results, renew a prescription or communicate with your care team.     To access your existing account, please contact your [facility] or call [phone] for assistance.        Care EveryWhere ID     This is your Care EveryWhere ID. This could be used by other organizations to access your [facility] medical records  [id number]        Your Vitals Were     Height Last Period BMI (Body Mass Index)             5' 5\" (1.651 m) 09/15/2016 (Approximate) 29.79 kg/m2          Blood Pressure from Last 3 Encounters:   06/06/18 126/85   11/16/16 116/74   10/26/16 111/56    Weight from Last 3 Encounters:   06/20/18 179 lb (81.2 kg)   06/06/18 179 lb (81.2 kg)   11/16/16 175 lb (79.4 kg)              We " Performed the Following     Small Joint Injection/Arthrocentesis     SPORTS MEDICINE REFERRAL        Primary Care Provider Office Phone # Fax #    Rea Mikayla Andrade [phone]       [facility] [address]  [facility] [address]        Equal Access to Services     BERTHA HARINI : Hadii bernabe ku hadhaydeeo Soomaali, waaxda luqadaha, qaybta kaalmada adeegyada, waxlaron camacho amadamel kumari rikkilorene lunsford . So [facility] [phone].    ATENCIÓN: Si habla español, tiene a zuñiga disposición servicios gratuitos de asistencia lingüística. Llame al [phone].    We comply with applicable federal civil rights laws and [place] laws. We do not discriminate on the basis of race, color, national origin, age, disability, sex, sexual orientation, or gender identity.            Thank you!     Thank you for choosing [facility]  for your care. Our goal is always to provide you with excellent care. Hearing back from our patients is one way we can continue to improve our services. Please take a few minutes to complete the written survey that you may receive in the mail after your visit with us. Thank you!             Your Updated Medication List - Protect others around you: Learn how to safely use, store and throw away your medicines at www.disposemymeds.org.          This list is accurate as of 6/20/18 11:59 PM.  Always use your most recent med list.                   Brand Name Dispense Instructions for use Diagnosis    CENTRUM SILVER per tablet      Take 1 tablet by mouth        diphenhydrAMINE 25 MG capsule    BENADRYL     Take 50 mg by mouth At Bedtime        ibuprofen 600 MG tablet    ADVIL/MOTRIN    30 tablet    Take 1 tablet (600 mg) by mouth every 6 hours as needed for pain (mild)    S/P D&C (status post dilation and curettage)       lamoTRIgine 200 MG tablet    LaMICtal     Take 300 mg by mouth daily        omeprazole 20 MG CR capsule    priLOSEC     Take 20 mg by mouth 2 times daily         pseudoePHEDrine 30 MG tablet    SUDAFED     Take 30 mg by mouth every 4 hours as needed        Simethicone 125 MG Tabs      Take 125 mg by mouth as needed

## 2018-06-20 NOTE — PROGRESS NOTES
Subjective:   Torie Zarco is a 57 year old female who complains of bilateral hand pain. She notes that she has had aching in her bilateral hands, mostly over the right second through fourth digits at her DIP joints and PIP joints, the left second through fourth digit PIP joints, and the fourth and fifth digit DIP joints.  She has been told she has arthritis of the hands and she does have a family history of rheumatoid arthritis.  She also has left fourth finger or ring finger triggering and locking which can also be painful.  She is not diabetic.       She does note that her  was recently diagnosed with a grade 4 glioblastoma and so she is working through this with him and is under increased stress, and this, she feels, is affecting her hands as well.  She does note that she has some stiffness in her hands in the mornings, but it only lasts less than 30 minutes.     Background:   Date of injury: None   Duration of symptoms: 3 months  Mechanism of Injury: Insidious Onset; Unknown   Aggravating factors: Worse in the AM, grasping, clenching   Relieving Factors: NSAIDs and acetaminophen, heat  Prior Evaluation: Prior Physician Evalutation: Dr. Andrade     PAST MEDICAL, SOCIAL, SURGICAL AND FAMILY HISTORY: She  has a past medical history of Abnormal Pap smear (2013); Acid reflux; BALBIR (generalised anxiety disorder); Gestational diabetes; MDD (major depressive disorder), recurrent, in full remission (H); and PONV (postoperative nausea and vomiting).  She  has a past surgical history that includes HYSTEROS W PERMANENT FALLOPAIN IMPLANT (2009); Bunionectomy (2012); colonoscopy (2007); Hand surgery (2004); GYN surgery; orthopedic surgery; orthopedic surgery; Dilation and curettage, hysteroscopy diagnostic, combined (N/A, 10/26/2016); and biopsy (2016).  Her family history includes Anxiety Disorder in her mother; Cerebrovascular Disease in an other family member; Colon Cancer in her paternal grandmother; Coronary Artery  "Disease in her maternal grandmother and son; Depression in her sister; Osteoperosis in her mother; Thyroid Disease in her father.  She reports that she has never smoked. She has never used smokeless tobacco. She reports that she drinks alcohol. She reports that she does not use illicit drugs.    ALLERGIES: She has No Known Allergies.    CURRENT MEDICATIONS: She has a current medication list which includes the following prescription(s): diphenhydramine, ibuprofen, lamotrigine, centrum silver, omeprazole, pseudoephedrine, and simethicone.     REVIEW OF SYSTEMS: 12 point review of systems is negative except as noted above.     Exam:   Ht 5' 5\" (1.651 m)  Wt 179 lb (81.2 kg)  LMP 09/15/2016 (Approximate)  BMI 29.79 kg/m2      CONSTITUTIONAL: healthy, alert and no distress  HEAD: Normocephalic. No masses, lesions, tenderness or abnormalities  SKIN: no suspicious lesions or rashes  GAIT: normal  NEUROLOGIC: Non-focal  PSYCHIATRIC: affect normal/bright and mentation appears normal.    MUSCULOSKELETAL:   RIGHT HAND:  There is no deformity and no visible swelling.  The MCP joints are nontender and nonswollen.  The thumb CMC articulation is nontender and nonswollen.  On digits 2 through 4, she has mild tenderness with no significant swelling of all the PIP joints and DIP joints.     LEFT HAND:  The MCP joints are nontender and nonswollen.  The thumb CMC articulation is nontender and nonswollen.  On digits 2 through 4, the PIP joints are tender to palpation.  On digits 4 and 5, the DIP joints are tender to palpation.  The left ring finger demonstrates a tender nodule at the flexor tendon at the level of the A1 pulley with the presence of triggering.      IMAGING:  Radiographs of her bilateral hands are obtained and demonstrate polyarticular degenerative changes mostly involving the DIP and PIP joints.  There is no evidence of MCP joint involvement.       ASSESSMENT:  Torie Zarco is a 57-year-old female with bilateral hand " osteoarthritis and a left ring finger trigger finger.       PLAN:  We discussed the use of warm superficial heat to her bilateral hands.  We discussed the use of analgesics such as acetaminophen and ibuprofen.  We discussed the use of topical applications to assist with her pain relief.  With regards to her trigger finger, we discussed options and will proceed with a left ring finger trigger finger injection.  She will return for repeat injection or may want to proceed with surgical release if she does get adequate relief within the next 3-4 weeks.

## 2018-06-20 NOTE — PROGRESS NOTES
Small Joint Injection/Arthrocentesis  Date/Time: 6/20/2018 6:11 PM  Performed by: SHYAM SYKES  Authorized by: SHYAM SYKES     Indications:  Pain  Needle Size:  25 G  Approach:  Volar  Location:  Ring finger  Medications:  4 mg dexamethasone 4 MG/ML; 2 mg dexamethasone 4 MG/ML; 0.5 mL lidocaine (PF) 1 %  Medications comment:  The following medication was given:     MEDICATION:  Dexamethasone  ROUTE: SQ  SITE: Left ring finger  DOSE: 1.5 mL  LOT #: [id number]  : [employer]  EXPIRATION DATE: 07/19  NDC#: 66954-572-82   Was there drug waste? Yes  Amount of drug waste (mL): 0.5.  Reason for waste:  Single use vial      MEDICATION:  Lidocaine without epinephrine  ROUTE: SQ  SITE: Left ring finger  DOSE: 0.5cc  LOT #: [id number]  : [employer]  EXPIRATION DATE: 02/22  NDC#: 34779-446-56   Was there drug waste? Yes  Amount of drug waste (mL): 4.5.  Reason for waste:  Single use vial    Outcome:  Tolerated well, no immediate complications  Procedure discussed: discussed risks, benefits, and alternatives    Consent Given by:  Patient  Timeout: timeout called immediately prior to procedure    Prep: patient was prepped and draped in usual sterile fashion     Left ring finger trigger finger CSI

## 2018-06-20 NOTE — LETTER
6/20/2018      RE: Torie Zarco  [address]  [place] [address]        Subjective:   Torie Zarco is a 57 year old female who complains of bilateral hand pain. She notes that she has had aching in her bilateral hands, mostly over the right second through fourth digits at her DIP joints and PIP joints, the left second through fourth digit PIP joints, and the fourth and fifth digit DIP joints.  She has been told she has arthritis of the hands and she does have a family history of rheumatoid arthritis.  She also has left fourth finger or ring finger triggering and locking which can also be painful.  She is not diabetic.       She does note that her  was recently diagnosed with a grade 4 glioblastoma and so she is working through this with him and is under increased stress, and this, she feels, is affecting her hands as well.  She does note that she has some stiffness in her hands in the mornings, but it only lasts less than 30 minutes.     Background:   Date of injury: None   Duration of symptoms: 3 months  Mechanism of Injury: Insidious Onset; Unknown   Aggravating factors: Worse in the AM, grasping, clenching   Relieving Factors: NSAIDs and acetaminophen, heat  Prior Evaluation: Prior Physician Evalutation: Dr. Andrade     PAST MEDICAL, SOCIAL, SURGICAL AND FAMILY HISTORY: She  has a past medical history of Abnormal Pap smear (2013); Acid reflux; BALBIR (generalised anxiety disorder); Gestational diabetes; MDD (major depressive disorder), recurrent, in full remission (H); and PONV (postoperative nausea and vomiting).  She  has a past surgical history that includes HYSTEROS W PERMANENT FALLOPAIN IMPLANT (2009); Bunionectomy (2012); colonoscopy (2007); Hand surgery (2004); GYN surgery; orthopedic surgery; orthopedic surgery; Dilation and curettage, hysteroscopy diagnostic, combined (N/A, 10/26/2016); and biopsy (2016).  Her family history includes Anxiety Disorder in her mother; Cerebrovascular  "Disease in an other family member; Colon Cancer in her paternal grandmother; Coronary Artery Disease in her maternal grandmother and son; Depression in her sister; Osteoperosis in her mother; Thyroid Disease in her father.  She reports that she has never smoked. She has never used smokeless tobacco. She reports that she drinks alcohol. She reports that she does not use illicit drugs.    ALLERGIES: She has No Known Allergies.    CURRENT MEDICATIONS: She has a current medication list which includes the following prescription(s): diphenhydramine, ibuprofen, lamotrigine, centrum silver, omeprazole, pseudoephedrine, and simethicone.     REVIEW OF SYSTEMS: 12 point review of systems is negative except as noted above.     Exam:   Ht 5' 5\" (1.651 m)  Wt 179 lb (81.2 kg)  LMP 09/15/2016 (Approximate)  BMI 29.79 kg/m2      CONSTITUTIONAL: healthy, alert and no distress  HEAD: Normocephalic. No masses, lesions, tenderness or abnormalities  SKIN: no suspicious lesions or rashes  GAIT: normal  NEUROLOGIC: Non-focal  PSYCHIATRIC: affect normal/bright and mentation appears normal.    MUSCULOSKELETAL:   RIGHT HAND:  There is no deformity and no visible swelling.  The MCP joints are nontender and nonswollen.  The thumb CMC articulation is nontender and nonswollen.  On digits 2 through 4, she has mild tenderness with no significant swelling of all the PIP joints and DIP joints.     LEFT HAND:  The MCP joints are nontender and nonswollen.  The thumb CMC articulation is nontender and nonswollen.  On digits 2 through 4, the PIP joints are tender to palpation.  On digits 4 and 5, the DIP joints are tender to palpation.  The left ring finger demonstrates a tender nodule at the flexor tendon at the level of the A1 pulley with the presence of triggering.      IMAGING:  Radiographs of her bilateral hands are obtained and demonstrate polyarticular degenerative changes mostly involving the DIP and PIP joints.  There is no evidence of MCP " joint involvement.       ASSESSMENT:  Torie Zarco is a 57-year-old female with bilateral hand osteoarthritis and a left ring finger trigger finger.       PLAN:  We discussed the use of warm superficial heat to her bilateral hands.  We discussed the use of analgesics such as acetaminophen and ibuprofen.  We discussed the use of topical applications to assist with her pain relief.  With regards to her trigger finger, we discussed options and will proceed with a left ring finger trigger finger injection.  She will return for repeat injection or may want to proceed with surgical release if she does get adequate relief within the next 3-4 weeks.           Small Joint Injection/Arthrocentesis  Date/Time: 6/20/2018 6:11 PM  Performed by: SHYAM SYKES  Authorized by: SHYAM SYKES     Indications:  Pain  Needle Size:  25 G  Approach:  Volar  Location:  Ring finger  Medications:  4 mg dexamethasone 4 MG/ML; 2 mg dexamethasone 4 MG/ML; 0.5 mL lidocaine (PF) 1 %  Medications comment:  The following medication was given:     MEDICATION:  Dexamethasone  ROUTE: SQ  SITE: Left ring finger  DOSE: 1.5 mL  LOT #: [id number]  : [employer]  EXPIRATION DATE: 07/19  NDC#: 55741-320-48   Was there drug waste? Yes  Amount of drug waste (mL): 0.5.  Reason for waste:  Single use vial      MEDICATION:  Lidocaine without epinephrine  ROUTE: SQ  SITE: Left ring finger  DOSE: 0.5cc  LOT #: [id number]  : [employer]  EXPIRATION DATE: 02/22  NDC#: 46681-869-51   Was there drug waste? Yes  Amount of drug waste (mL): 4.5.  Reason for waste:  Single use vial    Outcome:  Tolerated well, no immediate complications  Procedure discussed: discussed risks, benefits, and alternatives    Consent Given by:  Patient  Timeout: timeout called immediately prior to procedure    Prep: patient was prepped and draped in usual sterile fashion     Left ring finger trigger finger CSI            Shyam Sykes MD

## 2018-06-21 RX ORDER — LIDOCAINE HYDROCHLORIDE 10 MG/ML
0.5 INJECTION, SOLUTION EPIDURAL; INFILTRATION; INTRACAUDAL; PERINEURAL
Status: SHIPPED | OUTPATIENT
Start: 2018-06-20

## 2018-06-21 RX ORDER — DEXAMETHASONE SODIUM PHOSPHATE 4 MG/ML
2 INJECTION, SOLUTION INTRA-ARTICULAR; INTRALESIONAL; INTRAMUSCULAR; INTRAVENOUS; SOFT TISSUE
Status: SHIPPED | OUTPATIENT
Start: 2018-06-20

## 2018-06-21 RX ORDER — DEXAMETHASONE SODIUM PHOSPHATE 4 MG/ML
4 INJECTION, SOLUTION INTRA-ARTICULAR; INTRALESIONAL; INTRAMUSCULAR; INTRAVENOUS; SOFT TISSUE
Status: SHIPPED | OUTPATIENT
Start: 2018-06-20

## 2018-07-25 ENCOUNTER — OFFICE VISIT (OUTPATIENT)
Dept: ORTHOPEDICS | Facility: CLINIC | Age: 58
End: 2018-07-25
Payer: COMMERCIAL

## 2018-07-25 VITALS
WEIGHT: 179 LBS | DIASTOLIC BLOOD PRESSURE: 92 MMHG | SYSTOLIC BLOOD PRESSURE: 148 MMHG | HEIGHT: 65 IN | BODY MASS INDEX: 29.82 KG/M2

## 2018-07-25 DIAGNOSIS — M19.041 PRIMARY OSTEOARTHRITIS OF BOTH HANDS: Primary | ICD-10-CM

## 2018-07-25 DIAGNOSIS — M19.042 PRIMARY OSTEOARTHRITIS OF BOTH HANDS: Primary | ICD-10-CM

## 2018-07-25 DIAGNOSIS — M79.641 BILATERAL HAND PAIN: ICD-10-CM

## 2018-07-25 DIAGNOSIS — M79.642 BILATERAL HAND PAIN: ICD-10-CM

## 2018-07-25 RX ORDER — MELOXICAM 7.5 MG/1
7.5 TABLET ORAL DAILY
Qty: 30 TABLET | Refills: 1 | Status: SHIPPED | OUTPATIENT
Start: 2018-07-25 | End: 2020-10-28

## 2018-07-25 NOTE — PROGRESS NOTES
"[facility] Note      Patient is a 57 year old female who presents to the office today for: Bilateral hand pain.  Was seen by Dr. Henderson in June this year and received injection in the left hand ring finger for trigger finger.  Did not notice much benefit at that time.  Currently her pain is diffuse throughout her hands and seem to involve all the joints of her fingers.  Worse with gripping and grasping.  Has stiffness in the morning and improves with some time.  NSAIDs and heat have been helpful.  Has been told previously that she has osteoarthritis in her hands.  Had brief rheumatologic workup including rheumatoid factor, ESR, CRP which was all negative.  History of rheumatoid arthritis in grandmother.  Has possibly had some mild joint swelling but has not had any warmth or redness emanating from joints.  No joint pain outside of hands.    Denies weakness, numbness, tingling, clicking, locking, or catching.      Past Medical History, Current Medications, and Allergies are reviewed in the electronic medical record as appropriate.     ROS: Pertinent items are noted in HPI.  Constitutional: negative for fevers, chills and malaise  Cardiovascular: negative for dyspnea, fatigue, lower extremity edema  Integument/breast: negative for rash, skin lesion(s) and skin color change  Neurological: negative for paresthesia and weakness      EXAM:BP (!) 148/92  Ht 5' 5\" (1.651 m)  Wt 179 lb (81.2 kg)  LMP 09/15/2016 (Approximate)  BMI 29.79 kg/m2    General: Alert, pleasant, no distress  Bilateral hands: Warm, well-perfused.  No effusion, soft tissue swelling, erythema, deformity.  Range of motion symmetric and full bilaterally at MCP, PIP, DIP. No clicking, locking, catching noted with ROM testing.  has pain in the IP and DIP joints with grasping.  No weakness appreciated.  SI LT throughout hands.    Imaging: X-ray of bilateral hands performed on 6/20/2018 reviewed independently.  Per " radiology report:   Impression:  1. No acute osseous abnormality.  2. Mild polyarticular osteoarthrosis.      Assessment: Patient is a 57 year old female with diffuse bilateral hand pain which could possibly be from DJD as seen on previous x-ray, though her degree of symptoms and joints involved seem to extend beyond what is seen on the imaging.  Despite negative RF, ESR, CRP, have some remaining concern for inflammatory process.    Recommendations:   Reviewed previous imaging and assessment with the patient in detail.  Recommended follow-up with rheumatology for further evaluation of rheumatologic illness given her diffuse symptoms and family history.  For her current symptoms have recommended daily meloxicam for the next 2-4 weeks to be used in place of OTC oral NSAIDs. Topical medications ok.   Also recommended follow-up with hand therapy    Saeid Henning MD

## 2018-07-25 NOTE — MR AVS SNAPSHOT
After Visit Summary   7/25/2018    Torie Zarco    MRN: [id number]           Patient Information     Date Of Birth          1960        Visit Information        Provider Department      7/25/2018 8:40 AM Saeid Henning MD [facility]        Today's Diagnoses     Primary osteoarthritis of both hands    -  1    Bilateral hand pain          Care Instructions    Take meloxicam daily with food  Ok to take acetaminophen with meloxicam  Do not take iburpofen, naproxen with meloxicam  Try using OTC cream with salicylate as active ingredient  Follow up with hand therapy  Follow up with rheumatology          Follow-ups after your visit        Additional Services     [facility] PT, HAND, AND CHIROPRACTIC REFERRAL       Physical Therapy Referral            RHEUMATOLOGY REFERRAL       Your provider has referred you to: [facility]: Rheumatology Clinic [facility] [phone]   [email or web address]    Please be aware that coverage of these services is subject to the terms and limitations of your health insurance plan.  Call member services at your health plan with any benefit or coverage questions.      Please bring the following with you to your appointment:    (1) Any X-Rays, CTs or MRIs which have been performed.  Contact the facility where they were done to arrange for  prior to your scheduled appointment.    (2) List of current medications   (3) This referral request   (4) Any documents/labs given to you for this referral                  Who to contact     Please call your clinic at [phone] to:    Ask questions about your health    Make or cancel appointments    Discuss your medicines    Learn about your test results    Speak to your doctor            Additional Information About Your Visit        [employer] Information     P[employer] gives you secure access to your electronic health record. If you see a primary care provider, you  "can also send messages to your care team and make appointments. If you have questions, please call your primary care clinic.  If you do not have a primary care provider, please call [phone] and they will assist you.      [employer] is an electronic gateway that provides easy, online access to your medical records. With [employer], you can request a clinic appointment, read your test results, renew a prescription or communicate with your care team.     To access your existing account, please contact your [facility] or call [phone] for assistance.        Care EveryWhere ID     This is your Care EveryWhere ID. This could be used by other organizations to access your [facility] medical records  [id number]        Your Vitals Were     Height Last Period BMI (Body Mass Index)             5' 5\" (1.651 m) 09/15/2016 (Approximate) 29.79 kg/m2          Blood Pressure from Last 3 Encounters:   07/25/18 (!) 148/92   06/06/18 126/85   11/16/16 116/74    Weight from Last 3 Encounters:   07/25/18 179 lb (81.2 kg)   06/20/18 179 lb (81.2 kg)   06/06/18 179 lb (81.2 kg)              We Performed the Following     [facility] PT, HAND, AND CHIROPRACTIC REFERRAL     RHEUMATOLOGY REFERRAL          Today's Medication Changes          These changes are accurate as of 7/25/18  9:36 AM.  If you have any questions, ask your nurse or doctor.               Start taking these medicines.        Dose/Directions    meloxicam 7.5 MG tablet   Commonly known as:  MOBIC   Used for:  Primary osteoarthritis of both hands   Started by:  Saeid Henning MD        Dose:  7.5 mg   Take 1 tablet (7.5 mg) by mouth daily   Quantity:  30 tablet   Refills:  1            Where to get your medicines      These medications were sent to [facility]/pharmacy #[id number] - [place], [place] - [address]  [address], [facility] [address]     Phone:  [phone]     meloxicam 7.5 MG tablet                Primary Care Provider " Office Phone # Fax #    Rea Mikayla Andrade [phone]       [facility] [address]  [facility] [address]        Equal Access to Services     RADHABERTHA HARINI : Oscar bernabe hinton bety Archer, wabenjaminda luqmanan, qaybta kaalmada mckinley, gavin keyes laNasimaluis dhaliwal. So [facility] [phone].    ATENCIÓN: Si habla español, tiene a zuñiga disposición servicios gratuitos de asistencia lingüística. Fawn al [phone].    We comply with applicable federal civil rights laws and [place] laws. We do not discriminate on the basis of race, color, national origin, age, disability, sex, sexual orientation, or gender identity.            Thank you!     Thank you for choosing [facility] ORTHOPA[facility] W[facility] IN CLINIC  for your care. Our goal is always to provide you with excellent care. Hearing back from our patients is one way we can continue to improve our services. Please take a few minutes to complete the written survey that you may receive in the mail after your visit with us. Thank you!             Your Updated Medication List - Protect others around you: Learn how to safely use, store and throw away your medicines at www.disposemymeds.org.          This list is accurate as of 7/25/18  9:36 AM.  Always use your most recent med list.                   Brand Name Dispense Instructions for use Diagnosis    CENTRUM SILVER per tablet      Take 1 tablet by mouth        diphenhydrAMINE 25 MG capsule    BENADRYL     Take 50 mg by mouth At Bedtime        ibuprofen 600 MG tablet    ADVIL/MOTRIN    30 tablet    Take 1 tablet (600 mg) by mouth every 6 hours as needed for pain (mild)    S/P D&C (status post dilation and curettage)       lamoTRIgine 200 MG tablet    LaMICtal     Take 300 mg by mouth daily        meloxicam 7.5 MG tablet    MOBIC    30 tablet    Take 1 tablet (7.5 mg) by mouth daily    Primary osteoarthritis of both hands       omeprazole 20 MG CR capsule    priLOSEC     Take  20 mg by mouth 2 times daily        pseudoePHEDrine 30 MG tablet    SUDAFED     Take 30 mg by mouth every 4 hours as needed        Simethicone 125 MG Tabs      Take 125 mg by mouth as needed

## 2018-07-25 NOTE — LETTER
7/25/2018       RE: Torie Zarco  [address]  [place] [address]     Dear Colleague,    Thank you for referring your patient, Torie Zarco, to the [facility] ORTHOPA[facility] CLINIC at [facility]. Please see a copy of my visit note below.          SPORTS & ORTHOPEDIC WALK-IN VISIT 7/25/2018    Primary Care Physician:      Bilateral hand pain for about 4 months.  She saw Dr. Henderson in June and received a trigger finger injection in the left ring finger. Yung pain over the bilateral second through fourth DIP and PIP joints. She has been told in the past that she has arthritis and has a fam hx of rheumatoid arthritis. Pain is elicited by gripping and clenching fists. Pain is worse in the morning. NSAIDs and heat relieve pain.      She is here today because she had no relief from the injection and feels the pain and trigger finger has gotten worse. She awoke last night with high intensity pain and wants to know what is causing this pain. She is wondering if it could be gout. She does not want to have surgery.     Reason for visit:     What part of your body is injured / painful?  bilateral hand pain     What caused the injury /pain? No inciting event     How long ago did your injury occur or pain begin? several months ago (March)    What are your most bothersome symptoms? Pain    How would you characterize your symptom?  aching    What makes your symptoms better? Heat, Tylenol      What makes your symptoms worse? Movement    Have you been previously seen for this problem? Yes, Dr. Henderson and PCP    Medical History:    Any recent changes to your medical history? No    Any new medication prescribed since last visit? No    Have you had surgery on this body part before? 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 No    Do you have any vision  "problems? No    Do you have any chest pain or edema? No    Do you have any shortness of breath or wheezing?  No    Do you have stomach problems? No    Do you have any numbness or focal weakness? No    Do you have diabetes? No    Do you have problems with bleeding or clotting? No    Do you have an rashes or other skin lesions? No           [facility] Note      Patient is a 57 year old female who presents to the office today for: Bilateral hand pain.  Was seen by Dr. Henderson in June this year and received injection in the left hand ring finger for trigger finger.  Did not notice much benefit at that time.  Currently her pain is diffuse throughout her hands and seem to involve all the joints of her fingers.  Worse with gripping and grasping.  Has stiffness in the morning and improves with some time.  NSAIDs and heat have been helpful.  Has been told previously that she has osteoarthritis in her hands.  Had brief rheumatologic workup including rheumatoid factor, ESR, CRP which was all negative.  History of rheumatoid arthritis in grandmother.  Has possibly had some mild joint swelling but has not had any warmth or redness emanating from joints.  No joint pain outside of hands.    Denies weakness, numbness, tingling, clicking, locking, or catching.      Past Medical History, Current Medications, and Allergies are reviewed in the electronic medical record as appropriate.     ROS: Pertinent items are noted in HPI.  Constitutional: negative for fevers, chills and malaise  Cardiovascular: negative for dyspnea, fatigue, lower extremity edema  Integument/breast: negative for rash, skin lesion(s) and skin color change  Neurological: negative for paresthesia and weakness      EXAM:BP (!) 148/92  Ht 5' 5\" (1.651 m)  Wt 179 lb (81.2 kg)  LMP 09/15/2016 (Approximate)  BMI 29.79 kg/m2    General: Alert, pleasant, no distress  Bilateral hands: Warm, well-perfused.  No effusion, soft tissue " swelling, erythema, deformity.  Range of motion symmetric and full bilaterally at MCP, PIP, DIP. No clicking, locking, catching noted with ROM testing.  has pain in the IP and DIP joints with grasping.  No weakness appreciated.  SI LT throughout hands.    Imaging: X-ray of bilateral hands performed on 6/20/2018 reviewed independently.  Per radiology report:   Impression:  1. No acute osseous abnormality.  2. Mild polyarticular osteoarthrosis.      Assessment: Patient is a 57 year old female with diffuse bilateral hand pain which could possibly be from DJD as seen on previous x-ray, though her degree of symptoms and joints involved seem to extend beyond what is seen on the imaging.  Despite negative RF, ESR, CRP, have some remaining concern for inflammatory process.    Recommendations:   Reviewed previous imaging and assessment with the patient in detail.  Recommended follow-up with rheumatology for further evaluation of rheumatologic illness given her diffuse symptoms and family history.  For her current symptoms have recommended daily meloxicam for the next 2-4 weeks to be used in place of OTC oral NSAIDs. Topical medications ok.   Also recommended follow-up with hand therapy    Saeid Henning MD

## 2018-07-25 NOTE — PATIENT INSTRUCTIONS
Take meloxicam daily with food  Ok to take acetaminophen with meloxicam  Do not take iburpofen, naproxen with meloxicam  Try using OTC cream with salicylate as active ingredient  Follow up with hand therapy  Follow up with rheumatology

## 2018-07-25 NOTE — PROGRESS NOTES
SPORTS & ORTHOPEDIC WALK-IN VISIT 7/25/2018    Primary Care Physician:      Bilateral hand pain for about 4 months.  She saw Dr. Henderson in June and received a trigger finger injection in the left ring finger. Yung pain over the bilateral second through fourth DIP and PIP joints. She has been told in the past that she has arthritis and has a fam hx of rheumatoid arthritis. Pain is elicited by gripping and clenching fists. Pain is worse in the morning. NSAIDs and heat relieve pain.      She is here today because she had no relief from the injection and feels the pain and trigger finger has gotten worse. She awoke last night with high intensity pain and wants to know what is causing this pain. She is wondering if it could be gout. She does not want to have surgery.     Reason for visit:     What part of your body is injured / painful?  bilateral hand pain     What caused the injury /pain? No inciting event     How long ago did your injury occur or pain begin? several months ago (March)    What are your most bothersome symptoms? Pain    How would you characterize your symptom?  aching    What makes your symptoms better? Heat, Tylenol      What makes your symptoms worse? Movement    Have you been previously seen for this problem? Yes, Dr. Henderson and PCP    Medical History:    Any recent changes to your medical history? No    Any new medication prescribed since last visit? No    Have you had surgery on this body part before? 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 No    Do you have any vision problems? No    Do you have any chest pain or edema? No    Do you have any shortness of breath or wheezing?  No    Do you have stomach problems? No    Do you have any numbness or focal weakness? No    Do you have diabetes? No    Do you have problems with bleeding or clotting? No    Do you have an rashes or  other skin lesions? No

## 2018-08-01 ENCOUNTER — THERAPY VISIT (OUTPATIENT)
Dept: OCCUPATIONAL THERAPY | Facility: CLINIC | Age: 58
End: 2018-08-01
Attending: FAMILY MEDICINE
Payer: COMMERCIAL

## 2018-08-01 DIAGNOSIS — M79.641 PAIN OF RIGHT HAND: ICD-10-CM

## 2018-08-01 DIAGNOSIS — M79.642 PAIN OF LEFT HAND: ICD-10-CM

## 2018-08-01 PROCEDURE — 97166 OT EVAL MOD COMPLEX 45 MIN: CPT | Mod: GO | Performed by: OCCUPATIONAL THERAPIST

## 2018-08-01 PROCEDURE — 97535 SELF CARE MNGMENT TRAINING: CPT | Mod: GO | Performed by: OCCUPATIONAL THERAPIST

## 2018-08-01 PROCEDURE — G8985 CARRY GOAL STATUS: HCPCS | Mod: GO | Performed by: OCCUPATIONAL THERAPIST

## 2018-08-01 PROCEDURE — G8984 CARRY CURRENT STATUS: HCPCS | Mod: GO | Performed by: OCCUPATIONAL THERAPIST

## 2018-08-01 PROCEDURE — G8986 CARRY D/C STATUS: HCPCS | Mod: GO | Performed by: OCCUPATIONAL THERAPIST

## 2018-08-01 PROCEDURE — 29130 APPL FINGER SPLINT STATIC: CPT | Mod: GO | Performed by: OCCUPATIONAL THERAPIST

## 2018-08-01 NOTE — MR AVS SNAPSHOT
After Visit Summary   8/1/2018    Torei Zarco    MRN: [id number]           Patient Information     Date Of Birth          1960        Visit Information        Provider Department      8/1/2018 9:00 AM [facility]        Today's Diagnoses     Pain of left hand        Pain of right hand           Follow-ups after your visit        Your next 10 appointments already scheduled     Aug 08, 2018  9:30 AM CDT   [facility] Hand with [facility] ([facility])    [address] [place] [id number] [phone]            Aug 15, 2018  9:30 AM CDT   [facility] Hand with [facility] ([facility])    [address] [place] [id number] [phone]            Aug 22, 2018  9:30 AM CDT   [facility] ([facility])    [address] [place] [id number] [phone]            Aug 28, 2018  8:30 AM CDT   [facility] Hand with Caty Pulido OT    [facility] ([facility])    [address] [place] [id number] [phone]            Sep 04, 2018  8:30 AM CDT   [facility]UCY Hand with Caty Pulido OT    [facility] ([facility])    [address] [place] [id number] [phone]            Sep 10, 2018  9:30 AM CDT   LUCY Hand with Rufina Pringle OT    [facility] ([facility])    [address] [place] [id number] [phone]              Who to contact     If you have questions or need follow up information about today's clinic visit or your schedule please contact [facility] directly at [phone].  Normal or non-critical lab and imaging results will be communicated to you  by [employer], letter or phone within 4 business days after the clinic has received the results. If you do not hear from us within 7 days, please contact the clinic through [employer] or phone. If you have a critical or abnormal lab result, we will notify you by phone as soon as possible.  Submit refill requests through [employer] or call your pharmacy and they will forward the refill request to us. Please allow 3 business days for your refill to be completed.          Additional Information About Your Visit        [employer] Information     [employer] gives you secure access to your electronic health record. If you see a primary care provider, you can also send messages to your care team and make appointments. If you have questions, please call your primary care clinic.  If you do not have a primary care provider, please call [phone] and they will assist you.        Care EveryWhere ID     This is your Care EveryWhere ID. This could be used by other organizations to access your [facility] medical records  [id number]        Your Vitals Were     Last Period                   09/15/2016 (Approximate)            Blood Pressure from Last 3 Encounters:   07/25/18 (!) 148/92   06/06/18 126/85   11/16/16 116/74    Weight from Last 3 Encounters:   07/25/18 81.2 kg (179 lb)   06/20/18 81.2 kg (179 lb)   06/06/18 81.2 kg (179 lb)              We Performed the Following     APPLY FINGER SPLINT,STATIC     INITIAL EVAL REPORT     OT Eval, Moderate Complexity ([id number])     SELF CARE MNGMENT TRAINING        Primary Care Provider Office Phone # Fax #    Rea Mikayla Andrade [phone]       [facility] [address]  [facility] [address]        Equal Access to Services     [facility] AH: Hadii bernabe Archer, veronica sweet, gavin noble. So [facility] [phone].    ATENCIÓN: Si habla español, tiene a zuñiga disposición servicios gratuitos de asistencia  lingüística. Fawn al [phone].    We comply with applicable federal civil rights laws and [place] laws. We do not discriminate on the basis of race, color, national origin, age, disability, sex, sexual orientation, or gender identity.            Thank you!     Thank you for choosing [facility]  for your care. Our goal is always to provide you with excellent care. Hearing back from our patients is one way we can continue to improve our services. Please take a few minutes to complete the written survey that you may receive in the mail after your visit with us. Thank you!             Your Updated Medication List - Protect others around you: Learn how to safely use, store and throw away your medicines at www.disposemymeds.org.          This list is accurate as of 8/1/18  2:51 PM.  Always use your most recent med list.                   Brand Name Dispense Instructions for use Diagnosis    CENTRUM SILVER per tablet      Take 1 tablet by mouth        diphenhydrAMINE 25 MG capsule    BENADRYL     Take 50 mg by mouth At Bedtime        ibuprofen 600 MG tablet    ADVIL/MOTRIN    30 tablet    Take 1 tablet (600 mg) by mouth every 6 hours as needed for pain (mild)    S/P D&C (status post dilation and curettage)       lamoTRIgine 200 MG tablet    LaMICtal     Take 300 mg by mouth daily        meloxicam 7.5 MG tablet    MOBIC    30 tablet    Take 1 tablet (7.5 mg) by mouth daily    Primary osteoarthritis of both hands       omeprazole 20 MG CR capsule    priLOSEC     Take 20 mg by mouth 2 times daily        pseudoePHEDrine 30 MG tablet    SUDAFED     Take 30 mg by mouth every 4 hours as needed        Simethicone 125 MG Tabs      Take 125 mg by mouth as needed

## 2018-08-01 NOTE — PROGRESS NOTES
Hand Therapy Initial Evaluation    Current Date: 8/01/2018    Diagnosis OA flare in March with associated trigger finger  X ray involvement PIP and DIP level  Procedure: Trigger finger injection in June  Post: Diagnosis in June 2018 2 months post    Subjective:  Torie Zarco is a 57 year old right hand dominate female. Patient reports symptoms of pain, stiffness/loss of motion, weakness/loss of strength and edema of the bilateral hands which occurred due to use. Since onset symptoms are Gradually getting worse.  Special tests:  x-ray.  Previous treatment: left eye PIP and DIP.    General health as reported by patient is good.  Pertinent medical history includes:Depression, Menopausal, Osteoarthritis, Pain at Night/Rest  Medical allergies:none.  Surgical history: none.  Medication history: antiinflammatory medication but had side effect of nausea. Note  is terminally ill adding life stress.     Occupational Profile Information:  Current occupation is Self Employed  Currently working in normal job without restrictions  Job Tasks: Computer Work, Prolonged Sitting, Repetitive Tasks  Prior functional level:  no limitations  Barriers include:none  Mobility: No difficulty  Transportation: drives  Leisure activities/hobbies: Walking and swimming  Other:  is terminally ill so she is heavily involved in caretaking    Functional Outcome Measure: 71    Objective:  Pain Level Report  VAS(0-10) 8/1/2018   At Rest: 1/10   With Use: 7/10     Report of Pain:  Location:  hand  Pain Quality:  Aching, Dull and Sharp  Frequency: intermittent    Pain is worst:  nighttime  Exacerbated by:  Use  Relieved by:  heat  Progression:  Gradually increasing  Edema:  MILD inflammation noted over volar MP, webspaces, and P1  Not Trigger Finger severe that did not respond to injection.    Sensation: WNL throughout all nerve distributions; per patient report. Note ring finger constriction is note on base of ring, which is triggering, Pt  advised to remove ring.  Splint anti trigger splint added.     ROM:  Full AROM.     Strength:  Not tested    Assessment:  Patient presents with symptoms consistent with diagnosis of osteoarthritis,  with conservative intervention.     Patient's limitations or Problem List includes:  Pain, Increased edema and Decreased  of the bilateral hand which interferes with the patient's ability to perform Household Chores as compared to previous level of function.    Rehab Potential:  Good - Return to full activity, some limitations    Patient will benefit from skilled Occupational Therapy to decrease pain and edema to return to previous activity level and resume normal daily tasks and to reach their rehab potential.    Barriers to Learning:  No barrier    Communication Issues:  Patient appears to be able to clearly communicate and understand verbal and written communication and follow directions correctly.    Chart Review: Chart Review    Identified Performance Deficits: feeding and home establishment and management    Assessment of Occupational Performance:  3-5 Performance Deficits    Clinical Decision Making (Complexity): Moderate complexity    Treatment Explanation:  The following has been discussed with the patient:  RX ordered/plan of care  Anticipated outcomes  Possible risks and side effects    Plan:  Frequency:  1 X week, once daily  Duration:  for 1 months    Treatment Plan:   Modalities:  Paraffin  Therapeutic Exercise:  AROM  Manual Techniques:  Myofascial release, MEM  Orthotic Fabrication:  Static orthosis assess for thumb  Self Care:  Self Care Tasks  Discharge Plan:  Achieve all LTG.  Independent in home treatment program.  Reach maximal therapeutic benefit.    Home Exercise Program:  IMAK Glove night  Anti-inflammation Tart Cherry Juice  Paraffin or heat on HP      Next Visit:  Hand tendon gliding  Assess Thumb stability CMC  Isometric   Intrinsic stretch  Tendon gliding  Bakari Chi program

## 2018-08-08 ENCOUNTER — THERAPY VISIT (OUTPATIENT)
Dept: OCCUPATIONAL THERAPY | Facility: CLINIC | Age: 58
End: 2018-08-08
Payer: COMMERCIAL

## 2018-08-08 DIAGNOSIS — M79.641 PAIN OF RIGHT HAND: ICD-10-CM

## 2018-08-08 DIAGNOSIS — M79.642 PAIN OF LEFT HAND: ICD-10-CM

## 2018-08-08 PROCEDURE — 97110 THERAPEUTIC EXERCISES: CPT | Mod: GO | Performed by: OCCUPATIONAL THERAPIST

## 2018-08-08 PROCEDURE — 97140 MANUAL THERAPY 1/> REGIONS: CPT | Mod: GO | Performed by: OCCUPATIONAL THERAPIST

## 2018-08-15 ENCOUNTER — THERAPY VISIT (OUTPATIENT)
Dept: OCCUPATIONAL THERAPY | Facility: CLINIC | Age: 58
End: 2018-08-15
Payer: COMMERCIAL

## 2018-08-15 DIAGNOSIS — M79.641 PAIN OF RIGHT HAND: ICD-10-CM

## 2018-08-15 DIAGNOSIS — M79.642 PAIN OF LEFT HAND: ICD-10-CM

## 2018-08-15 PROCEDURE — 97763 ORTHC/PROSTC MGMT SBSQ ENC: CPT | Mod: GO | Performed by: OCCUPATIONAL THERAPIST

## 2018-08-15 PROCEDURE — 97035 APP MDLTY 1+ULTRASOUND EA 15: CPT | Mod: GO | Performed by: OCCUPATIONAL THERAPIST

## 2018-08-15 PROCEDURE — 97140 MANUAL THERAPY 1/> REGIONS: CPT | Mod: GO | Performed by: OCCUPATIONAL THERAPIST

## 2018-08-22 ENCOUNTER — THERAPY VISIT (OUTPATIENT)
Dept: OCCUPATIONAL THERAPY | Facility: CLINIC | Age: 58
End: 2018-08-22
Payer: COMMERCIAL

## 2018-08-22 DIAGNOSIS — M79.642 PAIN OF LEFT HAND: ICD-10-CM

## 2018-08-22 DIAGNOSIS — M79.641 PAIN OF RIGHT HAND: ICD-10-CM

## 2018-08-22 PROCEDURE — 97140 MANUAL THERAPY 1/> REGIONS: CPT | Mod: GO | Performed by: OCCUPATIONAL THERAPIST

## 2018-08-22 PROCEDURE — 97035 APP MDLTY 1+ULTRASOUND EA 15: CPT | Mod: GO | Performed by: OCCUPATIONAL THERAPIST

## 2018-08-22 PROCEDURE — 97110 THERAPEUTIC EXERCISES: CPT | Mod: GO | Performed by: OCCUPATIONAL THERAPIST

## 2018-08-28 ENCOUNTER — THERAPY VISIT (OUTPATIENT)
Dept: OCCUPATIONAL THERAPY | Facility: CLINIC | Age: 58
End: 2018-08-28
Payer: COMMERCIAL

## 2018-08-28 DIAGNOSIS — M79.642 PAIN OF LEFT HAND: Primary | ICD-10-CM

## 2018-08-28 DIAGNOSIS — M79.641 PAIN OF RIGHT HAND: ICD-10-CM

## 2018-08-28 PROCEDURE — 97140 MANUAL THERAPY 1/> REGIONS: CPT | Mod: GO | Performed by: OCCUPATIONAL THERAPIST

## 2018-08-28 PROCEDURE — 97035 APP MDLTY 1+ULTRASOUND EA 15: CPT | Mod: GO | Performed by: OCCUPATIONAL THERAPIST

## 2018-08-28 PROCEDURE — 97110 THERAPEUTIC EXERCISES: CPT | Mod: GO | Performed by: OCCUPATIONAL THERAPIST

## 2018-08-28 PROCEDURE — 97535 SELF CARE MNGMENT TRAINING: CPT | Mod: GO | Performed by: OCCUPATIONAL THERAPIST

## 2018-09-04 ENCOUNTER — THERAPY VISIT (OUTPATIENT)
Dept: OCCUPATIONAL THERAPY | Facility: CLINIC | Age: 58
End: 2018-09-04
Payer: COMMERCIAL

## 2018-09-04 DIAGNOSIS — M25.641 FINGER STIFFNESS, RIGHT: ICD-10-CM

## 2018-09-04 DIAGNOSIS — M25.642 STIFFNESS OF FINGER JOINT OF LEFT HAND: ICD-10-CM

## 2018-09-04 DIAGNOSIS — M79.642 PAIN OF LEFT HAND: Primary | ICD-10-CM

## 2018-09-04 DIAGNOSIS — M79.641 PAIN OF RIGHT HAND: ICD-10-CM

## 2018-09-04 PROCEDURE — 97110 THERAPEUTIC EXERCISES: CPT | Mod: GO | Performed by: OCCUPATIONAL THERAPIST

## 2018-09-04 PROCEDURE — 97763 ORTHC/PROSTC MGMT SBSQ ENC: CPT | Mod: GO | Performed by: OCCUPATIONAL THERAPIST

## 2018-09-04 PROCEDURE — 97035 APP MDLTY 1+ULTRASOUND EA 15: CPT | Mod: GO | Performed by: OCCUPATIONAL THERAPIST

## 2018-09-04 PROCEDURE — 97140 MANUAL THERAPY 1/> REGIONS: CPT | Mod: GO | Performed by: OCCUPATIONAL THERAPIST

## 2018-09-04 NOTE — MR AVS SNAPSHOT
After Visit Summary   9/4/2018    Torie Zarco    MRN: [id number]           Patient Information     Date Of Birth          1960        Visit Information        Provider Department      9/4/2018 8:30 AM Caty Pulido OT [facility]        Today's Diagnoses     Pain of left hand    -  1    Pain of right hand        Stiffness of finger joint of left hand        Finger stiffness, right           Follow-ups after your visit        Your next 10 appointments already scheduled     Sep 10, 2018 11:00 AM CDT   LUCY Hand with Rufina Pringle OT   [facility] ([facility])    [address] [place] [address]  [address]th [facility] [address] [phone]              Who to contact     If you have questions or need follow up information about today's clinic visit or your schedule please contact [facility] directly at [phone].  Normal or non-critical lab and imaging results will be communicated to you by MyChart, letter or phone within 4 business days after the clinic has received the results. If you do not hear from us within 7 days, please contact the clinic through [employer] or phone. If you have a critical or abnormal lab result, we will notify you by phone as soon as possible.  Submit refill requests through [employer] or call your pharmacy and they will forward the refill request to us. Please allow 3 business days for your refill to be completed.          Additional Information About Your Visit        MyChart Information     [employer] gives you secure access to your electronic health record. If you see a primary care provider, you can also send messages to your care team and make appointments. If you have questions, please call your primary care clinic.  If you do not have a primary care provider, please call [phone] and they will assist you.        Care EveryWhere ID     This is your Care EveryWhere ID. This could be used by other organizations to  access your [place] medical records  [id number]        Your Vitals Were     Last Period                   09/15/2016 (Approximate)            Blood Pressure from Last 3 Encounters:   07/25/18 (!) 148/92   06/06/18 126/85   11/16/16 116/74    Weight from Last 3 Encounters:   07/25/18 81.2 kg (179 lb)   06/20/18 81.2 kg (179 lb)   06/06/18 81.2 kg (179 lb)              We Performed the Following     C OT ORTHOTICS/PROSTH MGMT &/TRAING SBSQ ENCTR, EA 15 MIN     LUCY PROGRESS NOTES REPORT     MANUAL THER TECH,1+REGIONS,EA 15 MIN     THERAPEUTIC EXERCISES     ULTRASOUND THERAPY        Primary Care Provider Office Phone # Fax #    Rea Mikayla Andrade [phone]       [facility] [address]  [facility] [address]        Equal Access to Services     [facility]: Hadii bernabe hinton hadasho Soprasanth, waaxda luqadaha, qaybta kaalmada mckinley, gavin lunsford . So [facility] [phone].    ATENCIÓN: Si habla español, tiene a zuñiga disposición servicios gratuitos de asistencia lingüística. Llame al [phone].    We comply with applicable federal civil rights laws and [place] laws. We do not discriminate on the basis of race, color, national origin, age, disability, sex, sexual orientation, or gender identity.            Thank you!     Thank you for choosing [facility]  for your care. Our goal is always to provide you with excellent care. Hearing back from our patients is one way we can continue to improve our services. Please take a few minutes to complete the written survey that you may receive in the mail after your visit with us. Thank you!             Your Updated Medication List - Protect others around you: Learn how to safely use, store and throw away your medicines at www.disposemymeds.org.          This list is accurate as of 9/4/18  9:20 AM.  Always use your most recent med list.                   Brand Name Dispense Instructions for use Diagnosis     CENTRUM SILVER per tablet      Take 1 tablet by mouth        diphenhydrAMINE 25 MG capsule    BENADRYL     Take 50 mg by mouth At Bedtime        ibuprofen 600 MG tablet    ADVIL/MOTRIN    30 tablet    Take 1 tablet (600 mg) by mouth every 6 hours as needed for pain (mild)    S/P D&C (status post dilation and curettage)       lamoTRIgine 200 MG tablet    LaMICtal     Take 300 mg by mouth daily        meloxicam 7.5 MG tablet    MOBIC    30 tablet    Take 1 tablet (7.5 mg) by mouth daily    Primary osteoarthritis of both hands       omeprazole 20 MG CR capsule    priLOSEC     Take 20 mg by mouth 2 times daily        pseudoePHEDrine 30 MG tablet    SUDAFED     Take 30 mg by mouth every 4 hours as needed        Simethicone 125 MG Tabs      Take 125 mg by mouth as needed

## 2018-09-04 NOTE — PROGRESS NOTES
Hand Therapy Progress Note    Current Date: 9/4/2018  Reporting period: 8/01/2018 to current date      Diagnosis OA flare in March with associated trigger finger  X ray involvement PIP and DIP level  Procedure: Trigger finger injection in June  Post: Diagnosis in June 2018 2 months post    Subjective:  Torie Zarco is a 57 year old right hand dominate female. Patient reports symptoms of pain, stiffness/loss of motion, weakness/loss of strength and edema of the bilateral hands which occurred due to use.   S:  Subjective changes as noted by patient: the fingers can be sore and stiff, in general the pain is less, and less triggering during the day. The hands don't wake me at night as before.   Functional changes noted by patient: No Change to Household Chores  Response to previous treatment:  good  Patient has noted adverse reaction to:   None    Occupational Profile Information:  Current occupation is Self Employed  Currently working in normal job without restrictions  Job Tasks: Computer Work, Prolonged Sitting, Repetitive Tasks  Transportation: drives  Leisure activities/hobbies: Walking and swimming  Other:  is terminally ill so she is heavily involved in caretaking    Functional Outcome Measure:   See flowsheet    Objective:  Pain Level Report  VAS(0-10) 8/1/2018 9/4   At Rest: 1/10 0/10   With Use: 7/10 7-8/10     Report of Pain:  Location:  hand  Pain Quality:  Aching, Dull and Sharp  Frequency: intermittent  , but less often than before, but still high when the triggering happens  Pain is worst:  nighttime  Exacerbated by:  Use  Relieved by:  heat  Progression:  Gradually increasing  Edema:  MILD inflammation noted over volar MP, webspaces, and P1  Not Trigger Finger severe that did not respond to injection.    Sensation: WNL throughout all nerve distributions; per patient report. Note ring finger constriction is note on base of ring, which is triggering, Pt advised to remove ring.  Splint anti trigger  splint added.     Stage of Stenosing Tenosynovitis (SST):   9/4/2018     right left   Triggering of RING  finger 2 3   Stage 1:  Normal  Stage 2:  Uneven motion of tendon  Stage 3:  Triggering, clicking, catching  Stage 4:  Locking in extension or flexion; unlocked by active motion  Stage 5:  Locking in extension or flexion; unlocked by passive motion  Stage 6:  Finger locked in extension or flexion    ROM: All Fingers     AROM(PROM) 9/4/2018 9/4/2018   E/F Right Left   Index MP     PIP     DIP     ABREU     Long MP     PIP     DIP     ABREU     Ring MP  claw   PIP /92 /75   DIP     ABREU     Small MP     PIP     DIP     ABREU         Strength:  Not tested    Assessment: Pt continues with triggering sx of both hands, and is responding well to intervention  Response to therapy has been improvement to:  ROM of Ring finger PIP flexion   Flexibility:  tendon gliding is improved  Pain:  frequency is less, duration of pain is decreased and less tender over affected area    Overall Assessment:  Patient would benefit from continued therapy to achieve rehab potential  STG/LTG:  STGoals have been reviewed and no progress has been made;  see goal sheet for details and changes.  I have re-evaluated this patient and find that the nature, scope, duration and intensity of the therapy is appropriate for the medical condition of the patient.    P:  Frequency/Duration:  Recommend continuing with the current treatment plan. 3 X a month, once daily  for 2 months    Recommendations for Continued Therapy  Treatment Plan:  Additions to Treatment Plan -  Modalities:  US  Treatment Plan:   Modalities:  Paraffin  Therapeutic Exercise:  AROM  Manual Techniques:  Myofascial release, MEM  Orthotic Fabrication:  Static orthosis assess for thumb  Self Care:  Self Care Tasks  Discharge Plan:  Achieve all LTG.  Independent in home treatment program.  Reach maximal therapeutic benefit.    Home Exercise Program:  IMAK Glove night    Paraffin or heat on  HP  9/4/2018  Wear finger Trigger finger orthoses, can trade out with either the PIP or the DIP orthosis for the Left, and start working out of the R orthosis daytime per triggering symptoms   Full claw fist actively, keeping the MPs at full/hyperextension    Next Visit:  Check Trigger finger status,   Check on thumb OA status, if appropriate, if orders

## 2018-09-08 ENCOUNTER — OFFICE VISIT (OUTPATIENT)
Dept: ORTHOPEDICS | Facility: CLINIC | Age: 58
End: 2018-09-08
Payer: COMMERCIAL

## 2018-09-08 VITALS — WEIGHT: 179 LBS | BODY MASS INDEX: 29.82 KG/M2 | HEIGHT: 65 IN

## 2018-09-08 DIAGNOSIS — M70.61 TROCHANTERIC BURSITIS OF BOTH HIPS: Primary | ICD-10-CM

## 2018-09-08 DIAGNOSIS — M70.62 TROCHANTERIC BURSITIS OF BOTH HIPS: Primary | ICD-10-CM

## 2018-09-08 DIAGNOSIS — M54.5 CHRONIC BILATERAL LOW BACK PAIN, WITH SCIATICA PRESENCE UNSPECIFIED: ICD-10-CM

## 2018-09-08 DIAGNOSIS — G89.29 CHRONIC BILATERAL LOW BACK PAIN, WITH SCIATICA PRESENCE UNSPECIFIED: ICD-10-CM

## 2018-09-08 RX ORDER — METHYLPREDNISOLONE 4 MG
TABLET, DOSE PACK ORAL
Qty: 21 TABLET | Refills: 0 | Status: SHIPPED | OUTPATIENT
Start: 2018-09-08 | End: 2019-04-28

## 2018-09-08 NOTE — PATIENT INSTRUCTIONS
Trochanteric Bursitis / Gluteal Tendinopathy    WHAT IS TROCHANTERIC BURSITIS?    Bursitis is irritation or inflammation of the bursa. A bursa is a fluid-filled sac that acts as a cushion between tendons, bones, and skin. There is a bump on the outer side of the upper part of the thigh bone (femur) called the greater trochanter. The trochanteric bursa is located over the greater trochanter. When this bursa is inflamed it is called trochanteric bursitis.          WHAT IS THE CAUSE?     The trochanteric bursa may be inflamed by a group of muscles or tendons rubbing over the bursa and causing friction against the thigh bone. Your iliotibial band goes from the iliac crest of your pelvis down the outer side of your thigh and attaches just below the knee. A tight iliotibial band can lead to trochanteric bursitis. This injury can occur with running, walking, or bicycling, especially when the bicycle seat is too high.    Trochanteric bursitis may also be caused by a fall, by a spine disorder, by differences in the length of your legs, or as a complication of hip surgery.    WHAT ARE THE SYMPTOMS?    You have pain on the upper outer area of your thigh or on the side of your hip. The pain is worse when you walk, bicycle, or go up or down stairs. You have pain when you move your thigh bone and feel tenderness in the area over the greater trochanter.    HOW IS IT DIAGNOSED?    Your healthcare provider will ask about your symptoms and examine your hip and thigh.    HOW IS IT TREATED?    To treat this condition:    Put an ice pack, gel pack, or package of frozen vegetables wrapped in a cloth on the painful area every 3 to 4 hours for up to 20 minutes at a time until the pain goes away.  Take an anti-inflammatory medicine, such as ibuprofen, as directed by your provider. Nonsteroidal anti-inflammatory medicines (NSAIDs) may cause stomach bleeding and other problems. These risks increase with age. Read the label and take as  directed. Unless recommended by your healthcare provider, do not take for more than 10 days.  Follow your provider s instructions for doing exercises to help you recover.    While you are recovering from your injury you will need to change your sport or activity to one that does not make your condition worse. For example, you may need to swim instead of running or bicycling. If you are bicycling, you may need to lower your bicycle seat.    A bursa that is only mildly inflamed and has just started to hurt may improve within a few weeks. A bursa that is significantly inflamed and has been painful for a long time may take up to a few months to improve. You need to stop doing the activities that cause pain until your bursa has healed.    Follow your healthcare provider's instructions. Ask your provider:    How and when you will hear your test results  How long it will take to recover  What activities you should avoid and when you can return to your normal activities  How to take care of yourself at home  What symptoms or problems you should watch for and what to do if you have them  Make sure you know when you should come back for a checkup.    HOW CAN I HELP PREVENT TROCHANTERIC BURSITIS?    Trochanteric bursitis is best prevented by warming up properly and stretching the muscles on the outer side of your upper thigh.    Developed by [employer].  Published by [employer].  Copyright  2014 [employer] and/or one of its subsidiaries. All rights reserved.    You can do the first 3 stretches to begin stretching the muscles that run along the outside of your hip. You can do the strengthening exercises when the sharp pain lessens.    STRETCHING EXERCISES    Gluteal stretch: Lie on your back with both knees bent. Rest the ankle on your injured side over the knee of your other leg. Grasp the thigh of the leg on the uninjured side and pull toward your chest. You will feel a stretch along the buttocks on the injured  side and possibly along the outside of your hip. Hold the stretch for 15 to 30 seconds. Repeat 3 times.    Iliotibial band stretch, standing: Cross your uninjured leg in front of the other leg and bend down and reach toward the inside of your back foot. Do not bend your knees. Hold this position for 15 to 30 seconds. Return to the starting position. Repeat 3 times.  Iliotibial band stretch, side-leaning: Stand sideways near a wall with your injured side closest to the wall. Place a hand on the wall for support. Cross the leg farther from the wall over the other leg. Keep the foot closest to the wall flat on the floor. Lean your hips into the wall. Hold the stretch for 15 to 30 seconds. Repeat 3 times.    STRENGTHENING EXERCISES    Straight leg raise: Lie on your back with your legs straight out in front of you. Bend the knee on your uninjured side and place the foot flat on the floor. Tighten the thigh muscle on your injured side and lift your leg about 8 inches off the floor. Keep your leg straight and your thigh muscle tight. Slowly lower your leg back down to the floor. Do 2 sets of 15.    Prone hip extension: Lie on your stomach with your legs straight out behind you. Fold your arms under your head and rest your head on your arms. Draw your belly button in towards your spine and tighten your abdominal muscles. Tighten the buttocks and thigh muscles of the leg on your injured side and lift the leg off the floor about 8 inches. Keep your leg straight. Hold for 5 seconds. Then lower your leg and relax. Do 2 sets of 15.    Side-lying leg lift: Lie on your uninjured side. Tighten the front thigh muscles on your injured leg and lift that leg 8 to 10 inches (20 to 25 centimeters) away from the other leg. Keep the leg straight and lower it slowly. Do 2 sets of 15.    Wall squat with a ball: Stand with your back, shoulders, and head against a wall. Look straight ahead. Keep your shoulders relaxed and your feet 3 feet (90  centimeters) from the wall and shoulder's width apart. Place a soccer or basketball-sized ball behind your back. Keeping your back against the wall, slowly squat down to a 45-degree angle. Your thighs will not yet be parallel to the floor. Hold this position for 10 seconds and then slowly slide back up the wall. Repeat 10 times. Build up to 2 sets of 15.    Clam exercise: Lie on your uninjured side with your hips and knees bent and feet together. Slowly raise your top leg toward the ceiling while keeping your heels touching each other. Hold for 2 seconds and lower slowly. Do 2 sets of 15 repetitions.    Side plank: Lie on your side with your legs, hips, and shoulders in a straight line. Prop yourself up onto your forearm with your elbow directly under your shoulder. Lift your hips off the floor and balance on your forearm and the outside of your foot. Try to hold this position for 15 seconds and then slowly lower your hip to the ground. Switch sides and repeat. Work up to holding for 1 minute. This exercise can be made easier by starting with your knees and hips flexed toward your chest.    The plank: Lie on your stomach resting on our forearms. With your legs straight, lift your hips off the floor until they are in line with your shoulders. Support yourself on your forearms and toes. Hold this position for 15 seconds. (If this is too difficult, you can modify it by placing your knees on the floor.) Repeat 3 times. Work up to increasing your hold time to 30 to 60 seconds.    Developed by [employer].  Published by [employer].  Copyright  2014 [employer] and/or one of its subsidiaries. All rights reserved.

## 2018-09-08 NOTE — PROGRESS NOTES
SPORTS & ORTHOPEDIC WALK-IN VISIT 9/8/2018    Primary Care Physician: Dr. Andrade  Has been seen at [facility] for LBP. XR done.  DDD. Referred to PT.    Pain as been on/off for years, but never this intense    Has increase activity. Pain along IT band, Greater troch    Reason for visit:     What part of your body is injured / painful?  left hip    What caused the injury /pain? Overuse injury from regular activity    How long ago did your injury occur or pain begin? several days ago    What are your most bothersome symptoms? Pain    How would you characterize your symptom?  sharp    What makes your symptoms better? Rest, Heat, Naproxen    What makes your symptoms worse? Standing and Walking, laying on side    Have you been previously seen for this problem? No    Medical History:    Any recent changes to your medical history? No    Any new medication prescribed since last visit? No    Have you had surgery on this body part before? No    Social History:    Occupation: Researcher    Handedness: Right    Exercise: Walking/ Pool    Review of Systems:    Do you have fever, chills, weight loss? No    Do you have any vision problems? No    Do you have any chest pain or edema? No    Do you have any shortness of breath or wheezing?  No    Do you have stomach problems? No    Do you have any numbness or focal weakness? No    Do you have diabetes? No    Do you have problems with bleeding or clotting? No    Do you have an rashes or other skin lesions? No

## 2018-09-08 NOTE — MR AVS SNAPSHOT
After Visit Summary   9/8/2018    Torie Zarco    MRN: [id number]           Patient Information     Date Of Birth          1960        Visit Information        Provider Department      9/8/2018 5:50 PM oMhamud Castorena DO [facility]        Today's Diagnoses     Trochanteric bursitis of both hips    -  1    Chronic bilateral low back pain, with sciatica presence unspecified          Care Instructions    Trochanteric Bursitis / Gluteal Tendinopathy    WHAT IS TROCHANTERIC BURSITIS?    Bursitis is irritation or inflammation of the bursa. A bursa is a fluid-filled sac that acts as a cushion between tendons, bones, and skin. There is a bump on the outer side of the upper part of the thigh bone (femur) called the greater trochanter. The trochanteric bursa is located over the greater trochanter. When this bursa is inflamed it is called trochanteric bursitis.          WHAT IS THE CAUSE?     The trochanteric bursa may be inflamed by a group of muscles or tendons rubbing over the bursa and causing friction against the thigh bone. Your iliotibial band goes from the iliac crest of your pelvis down the outer side of your thigh and attaches just below the knee. A tight iliotibial band can lead to trochanteric bursitis. This injury can occur with running, walking, or bicycling, especially when the bicycle seat is too high.    Trochanteric bursitis may also be caused by a fall, by a spine disorder, by differences in the length of your legs, or as a complication of hip surgery.    WHAT ARE THE SYMPTOMS?    You have pain on the upper outer area of your thigh or on the side of your hip. The pain is worse when you walk, bicycle, or go up or down stairs. You have pain when you move your thigh bone and feel tenderness in the area over the greater trochanter.    HOW IS IT DIAGNOSED?    Your healthcare provider will ask about your symptoms and examine your hip and thigh.    HOW IS IT  TREATED?    To treat this condition:    Put an ice pack, gel pack, or package of frozen vegetables wrapped in a cloth on the painful area every 3 to 4 hours for up to 20 minutes at a time until the pain goes away.  Take an anti-inflammatory medicine, such as ibuprofen, as directed by your provider. Nonsteroidal anti-inflammatory medicines (NSAIDs) may cause stomach bleeding and other problems. These risks increase with age. Read the label and take as directed. Unless recommended by your healthcare provider, do not take for more than 10 days.  Follow your provider s instructions for doing exercises to help you recover.    While you are recovering from your injury you will need to change your sport or activity to one that does not make your condition worse. For example, you may need to swim instead of running or bicycling. If you are bicycling, you may need to lower your bicycle seat.    A bursa that is only mildly inflamed and has just started to hurt may improve within a few weeks. A bursa that is significantly inflamed and has been painful for a long time may take up to a few months to improve. You need to stop doing the activities that cause pain until your bursa has healed.    Follow your healthcare provider's instructions. Ask your provider:    How and when you will hear your test results  How long it will take to recover  What activities you should avoid and when you can return to your normal activities  How to take care of yourself at home  What symptoms or problems you should watch for and what to do if you have them  Make sure you know when you should come back for a checkup.    HOW CAN I HELP PREVENT TROCHANTERIC BURSITIS?    Trochanteric bursitis is best prevented by warming up properly and stretching the muscles on the outer side of your upper thigh.    Developed by [employer].  Published by [employer].  Copyright  2014 [employer] and/or one of its subsidiaries. All rights reserved.    You can  do the first 3 stretches to begin stretching the muscles that run along the outside of your hip. You can do the strengthening exercises when the sharp pain lessens.    STRETCHING EXERCISES    Gluteal stretch: Lie on your back with both knees bent. Rest the ankle on your injured side over the knee of your other leg. Grasp the thigh of the leg on the uninjured side and pull toward your chest. You will feel a stretch along the buttocks on the injured side and possibly along the outside of your hip. Hold the stretch for 15 to 30 seconds. Repeat 3 times.    Iliotibial band stretch, standing: Cross your uninjured leg in front of the other leg and bend down and reach toward the inside of your back foot. Do not bend your knees. Hold this position for 15 to 30 seconds. Return to the starting position. Repeat 3 times.  Iliotibial band stretch, side-leaning: Stand sideways near a wall with your injured side closest to the wall. Place a hand on the wall for support. Cross the leg farther from the wall over the other leg. Keep the foot closest to the wall flat on the floor. Lean your hips into the wall. Hold the stretch for 15 to 30 seconds. Repeat 3 times.    STRENGTHENING EXERCISES    Straight leg raise: Lie on your back with your legs straight out in front of you. Bend the knee on your uninjured side and place the foot flat on the floor. Tighten the thigh muscle on your injured side and lift your leg about 8 inches off the floor. Keep your leg straight and your thigh muscle tight. Slowly lower your leg back down to the floor. Do 2 sets of 15.    Prone hip extension: Lie on your stomach with your legs straight out behind you. Fold your arms under your head and rest your head on your arms. Draw your belly button in towards your spine and tighten your abdominal muscles. Tighten the buttocks and thigh muscles of the leg on your injured side and lift the leg off the floor about 8 inches. Keep your leg straight. Hold for 5 seconds.  Then lower your leg and relax. Do 2 sets of 15.    Side-lying leg lift: Lie on your uninjured side. Tighten the front thigh muscles on your injured leg and lift that leg 8 to 10 inches (20 to 25 centimeters) away from the other leg. Keep the leg straight and lower it slowly. Do 2 sets of 15.    Wall squat with a ball: Stand with your back, shoulders, and head against a wall. Look straight ahead. Keep your shoulders relaxed and your feet 3 feet (90 centimeters) from the wall and shoulder's width apart. Place a soccer or basketball-sized ball behind your back. Keeping your back against the wall, slowly squat down to a 45-degree angle. Your thighs will not yet be parallel to the floor. Hold this position for 10 seconds and then slowly slide back up the wall. Repeat 10 times. Build up to 2 sets of 15.    Clam exercise: Lie on your uninjured side with your hips and knees bent and feet together. Slowly raise your top leg toward the ceiling while keeping your heels touching each other. Hold for 2 seconds and lower slowly. Do 2 sets of 15 repetitions.    Side plank: Lie on your side with your legs, hips, and shoulders in a straight line. Prop yourself up onto your forearm with your elbow directly under your shoulder. Lift your hips off the floor and balance on your forearm and the outside of your foot. Try to hold this position for 15 seconds and then slowly lower your hip to the ground. Switch sides and repeat. Work up to holding for 1 minute. This exercise can be made easier by starting with your knees and hips flexed toward your chest.    The plank: Lie on your stomach resting on our forearms. With your legs straight, lift your hips off the floor until they are in line with your shoulders. Support yourself on your forearms and toes. Hold this position for 15 seconds. (If this is too difficult, you can modify it by placing your knees on the floor.) Repeat 3 times. Work up to increasing your hold time to 30 to 60  "seconds.    Developed by [employer].  Published by [employer].  Copyright  2014 [employer] and/or one of its subsidiaries. All rights reserved.                Follow-ups after your visit        Additional Services     PHYSICAL THERAPY REFERRAL (Internal)       Physical Therapy Referral - Bilateral trochanteric bursitis. Gluteus medius pain. Lumbago chronic with DJD.                  Your next 10 appointments already scheduled     Sep 10, 2018 11:00 AM CDMAURILIO AYALA Hand with Rufina Pringle OT   [facility] ([facility])    [address] [place] [address]  [address]th [facility] [address] [phone]              Who to contact     Please call your clinic at [phone] to:    Ask questions about your health    Make or cancel appointments    Discuss your medicines    Learn about your test results    Speak to your doctor            Additional Information About Your Visit        [employer] Information     [employer] gives you secure access to your electronic health record. If you see a primary care provider, you can also send messages to your care team and make appointments. If you have questions, please call your primary care clinic.  If you do not have a primary care provider, please call [phone] and they will assist you.      [employer] is an electronic gateway that provides easy, online access to your medical records. With [employer], you can request a clinic appointment, read your test results, renew a prescription or communicate with your care team.     To access your existing account, please contact your [facility] or call [phone] for assistance.        Care EveryWhere ID     This is your Care EveryWhere ID. This could be used by other organizations to access your [place] medical records  [id number]        Your Vitals Were     Height Last Period BMI (Body Mass Index)             1.651 m (5' 5\") 09/15/2016 (Approximate) 29.79 kg/m2          " Blood Pressure from Last 3 Encounters:   07/25/18 (!) 148/92   06/06/18 126/85   11/16/16 116/74    Weight from Last 3 Encounters:   09/08/18 81.2 kg (179 lb)   07/25/18 81.2 kg (179 lb)   06/20/18 81.2 kg (179 lb)              We Performed the Following     PHYSICAL THERAPY REFERRAL (Internal)          Today's Medication Changes          These changes are accurate as of 9/8/18  6:51 PM.  If you have any questions, ask your nurse or doctor.               Start taking these medicines.        Dose/Directions    methylPREDNISolone 4 MG tablet   Commonly known as:  MEDROL DOSEPAK   Used for:  Trochanteric bursitis of both hips, Chronic bilateral low back pain, with sciatica presence unspecified        Follow package instructions   Quantity:  21 tablet   Refills:  0            Where to get your medicines      These medications were sent to [facility]/pharmacy #[id number] - [place], [place] - [address] AT [facility]  [address] [place] [address]    Hours:  24-hours Phone:  [phone]     methylPREDNISolone 4 MG tablet                Primary Care Provider Office Phone # Fax #    Rea Mikayla Andrade [phone]       [facility] [address]  [facility] [address]        Equal Access to Services     CHAI SCHULER AH: Oscar chávezo Soluisali, waaxda luqadaha, qaybta kaalmada adeegyada, gavin dhaliwal. So [facility] [phone].    ATENCIÓN: Si habla español, tiene a zuñiga disposición servicios gratuitos de asistencia lingüística. Llame al [phone].    We comply with applicable federal civil rights laws and [place] laws. We do not discriminate on the basis of race, color, national origin, age, disability, sex, sexual orientation, or gender identity.            Thank you!     Thank you for choosing [facility]  for your care. Our goal is always to provide you with excellent care. Hearing back from our patients  is one way we can continue to improve our services. Please take a few minutes to complete the written survey that you may receive in the mail after your visit with us. Thank you!             Your Updated Medication List - Protect others around you: Learn how to safely use, store and throw away your medicines at www.disposemymeds.org.          This list is accurate as of 9/8/18  6:51 PM.  Always use your most recent med list.                   Brand Name Dispense Instructions for use Diagnosis    CENTRUM SILVER per tablet      Take 1 tablet by mouth        diphenhydrAMINE 25 MG capsule    BENADRYL     Take 50 mg by mouth At Bedtime        ibuprofen 600 MG tablet    ADVIL/MOTRIN    30 tablet    Take 1 tablet (600 mg) by mouth every 6 hours as needed for pain (mild)    S/P D&C (status post dilation and curettage)       lamoTRIgine 200 MG tablet    LaMICtal     Take 300 mg by mouth daily        meloxicam 7.5 MG tablet    MOBIC    30 tablet    Take 1 tablet (7.5 mg) by mouth daily    Primary osteoarthritis of both hands       methylPREDNISolone 4 MG tablet    MEDROL DOSEPAK    21 tablet    Follow package instructions    Trochanteric bursitis of both hips, Chronic bilateral low back pain, with sciatica presence unspecified       omeprazole 20 MG CR capsule    priLOSEC     Take 20 mg by mouth 2 times daily        pseudoePHEDrine 30 MG tablet    SUDAFED     Take 30 mg by mouth every 4 hours as needed        Simethicone 125 MG Tabs      Take 125 mg by mouth as needed

## 2018-09-08 NOTE — LETTER
9/8/2018       RE: Torie Zarco  [address]  [place] [address]     Dear Colleague,    Thank you for referring your patient, Torie Zarco, to the [facility] at [facility]. Please see a copy of my visit note below.          SPORTS & ORTHOPEDIC WALK-IN VISIT 9/8/2018    Primary Care Physician: Dr. Andrade  Has been seen at [facility] for LBP. XR done.  DDD. Referred to PT.    Pain as been on/off for years, but never this intense    Has increase activity. Pain along IT band, Greater troch    Reason for visit:     What part of your body is injured / painful?  left hip    What caused the injury /pain? Overuse injury from regular activity    How long ago did your injury occur or pain begin? several days ago    What are your most bothersome symptoms? Pain    How would you characterize your symptom?  sharp    What makes your symptoms better? Rest, Heat, Naproxen    What makes your symptoms worse? Standing and Walking, laying on side    Have you been previously seen for this problem? No    Medical History:    Any recent changes to your medical history? No    Any new medication prescribed since last visit? No    Have you had surgery on this body part before? No    Social History:    Occupation: Researcher    Handedness: Right    Exercise: Walking/ Pool    Review of Systems:    Do you have fever, chills, weight loss? No    Do you have any vision problems? No    Do you have any chest pain or edema? No    Do you have any shortness of breath or wheezing?  No    Do you have stomach problems? No    Do you have any numbness or focal weakness? No    Do you have diabetes? No    Do you have problems with bleeding or clotting? No    Do you have an rashes or other skin lesions? No             CHIEF COMPLAINT:  Pain of the Lower Back       HISTORY OF PRESENT ILLNESS  Ms. Zarco is a pleasant 57 year old year old female who presents to clinic  today with low back pain and bilateral L>R hip pain.  Torie explains that left hip is most painful.  She believes pain began after increased walking, swimming over the last few weeks.  Pain began about 3 days ago.  Pain is sharp, at lateral hips.  Worse with laying on side/pressure to regions.  She also notes pain is worse with standing and walking.  Occasional radicular type pain down left lower extremity down posterior thigh below knee.  This is sharp in nature as well.  Also notes low lumbar pain which has increased about 3 days ago as well.      Treatment has included activity modification, heat, naproxen without relief.  Of note patient has been under a significant amount of stress due to her 's recent glioblastoma diagnosis.  Has increased her walking activity, walks with her  outside.  Swims in pool for exercise.    Additional history: History of chronic low back pain, XR 2017 at [facility].    MEDICAL HISTORY  Patient Active Problem List   Diagnosis     Menopausal syndrome (hot flashes)     Multiple joint pain     Hormone replacement therapy (HRT)     Major depressive disorder, recurrent, in full remission (H)     Generalized anxiety disorder     Pain of left hand     Pain of right hand     Stiffness of finger joint of left hand     Finger stiffness, right       Current Outpatient Prescriptions   Medication Sig Dispense Refill     methylPREDNISolone (MEDROL DOSEPAK) 4 MG tablet Follow package instructions 21 tablet 0     diphenhydrAMINE (BENADRYL) 25 MG capsule Take 50 mg by mouth At Bedtime        ibuprofen (ADVIL,MOTRIN) 600 MG tablet Take 1 tablet (600 mg) by mouth every 6 hours as needed for pain (mild) 30 tablet 0     lamoTRIgine (LAMICTAL) 200 MG tablet Take 300 mg by mouth daily        meloxicam (MOBIC) 7.5 MG tablet Take 1 tablet (7.5 mg) by mouth daily 30 tablet 1     Multiple Vitamins-Minerals (CENTRUM SILVER) per tablet Take 1 tablet by mouth       omeprazole (PRILOSEC) 20 MG  "capsule Take 20 mg by mouth 2 times daily        pseudoePHEDrine (SUDAFED) 30 MG tablet Take 30 mg by mouth every 4 hours as needed        Simethicone 125 MG TABS Take 125 mg by mouth as needed          No Known Allergies    Family History   Problem Relation Age of Onset     Anxiety Disorder Mother      Osteoporosis Mother      Thyroid Disease Father      Cerebrovascular Disease Other      TIA and PE     Colon Cancer Paternal Grandmother      1960s - full remission     Coronary Artery Disease Son      CHD - unrepaired aortic stenosis     Coronary Artery Disease Maternal Grandmother      Heart failure - cause of death     Depression Sister      suicide - 1985       Additional medical/Social/Surgical histories reviewed in [facility] and updated as appropriate.     REVIEW OF SYSTEMS (9/10/2018)  CONSTITUTIONAL: Denies fever and weight loss  EYES: Denies acute vision changes  ENT: Denies hearing changes or difficulty swallowing  CARDIAC: Denies chest pain or edema  RESPIRATORY: Denies dyspnea, cough or wheeze  GASTROINTESTINAL: Denies abdominal pain, nausea, vomiting  MUSCULOSKELETAL: See HPI  SKIN: Denies any recent rash or lesion  NEUROLOGICAL: Denies numbness or focal weakness  PSYCHIATRIC: BALBIR, MDD in remission  ENDOCRINE: Diagnosis of diabetes: No  HEMATOLOGY: Denies episodes of easy bleeding      PHYSICAL EXAM  Ht 1.651 m (5' 5\")  Wt 81.2 kg (179 lb)  LMP 09/15/2016 (Approximate)  BMI 29.79 kg/m2    General  - normal appearance, in no obvious distress  CV  - normal peripheral perfusion  Pulm  - normal respiratory pattern, non-labored  Musculoskeletal - lumbar spine  - stance: slow to rise and sit  - inspection: normal bone and joint alignment, no obvious scoliosis  - palpation: bilateral lumbar paravertebral spasm, most significant at L5-S1 L>R  - ROM: pain with bilateral rotation, flexion past 60 deg, extension  - strength: lower extremities 5/5 in all planes  - special tests:  (-) straight leg raise " bilaterally  (-) slump test  Musculoskeletal - bilateral hips  - stance: Relatively normal gait, no obvious leg length discrepancy, normal heel and toe walk  - inspection: no swelling or effusion,  normal bone and joint alignment, no obvious deformity  - palpation: tender over the greater trochanter on bilateral hips, gluteus medius tenderness bilaterally, external rotator pain/gluteal.  No significant SI joint pain  - ROM: Pain,weakness with active abduction on left, normal and painless flexion, extension, IR, ER, adduction  - strength: 5/5 in all planes  - special tests:  (-) CAROLYN  (-) FADIR  no pain with axial femoral load  Neuro  - no sensory or motor deficit, grossly normal coordination, normal muscle tone  Skin  - no ecchymosis, erythema, warmth, or induration, no obvious rash  Psych  - interactive, appropriate, normal mood and affect    IMAGING :   XR LUMBAR SPINE W FLEXION EXTENSION  6/9/2017 9:49 AM    INDICATION: Lumbar pain.      COMPARISON: Lumbosacral radiograph 04/18/2017    FINDINGS: In keeping with the prior lumbar spine numbering nomenclature,   hypoplastic ribs at T12 with transitional L5 which is partially sacralized   on the right. There is subtle levocurvature of the lumbar spine. 4 mm   spondylolisthesis of L4 on L5, does not change on extension but becomes 7   mm on flexion. 2 mm retrolisthesis of L2 on L3 does not change on extension   but becomes 1 mm on flexion. Vertebral body heights are maintained. No   pars defects. Mild to moderate facet arthropathy at L4-L5 and L5-S1. Mild   intervertebral disc height loss at L5-S1. Mild degenerative changes of   sacroiliac joints. Presumed occlusion devices in the pelvis as previously   suggested.      ASSESSMENT & PLAN  Ms. Zarco is a 57 year old year old female who presents to clinic today with acute on chronic low back pain and increasing bilateral, L>R hip pain laterally.  I suspect this is related to increased walking and core/hip weakness.  Underlying lumbar component of pain, possibly impingement in setting of DDD.  We discussed ice to hips and low back with heat.  Home exercises provided and encouraged light pool walking to start as exercise.  Rest from walking on pavement at this time.  Medrol dosepak. PT referral if no improvement in next week.      Diagnosis: Trochanteric bursitis, bilaterally.  Lumbar DDD.  Lumbago potentially associated with radiculopathy.     Follow up 4 weeks if persisting.  Sooner if radicular pain down legs increases. If no improvement, updated xrays, consider CSI trochanteric region.    It was a pleasure seeing Torie today.    Mohamud Castorena DO, [facility]  Primary Care Sports Medicine          Again, thank you for allowing me to participate in the care of your patient.      Sincerely,    Mohamud Castorena DO

## 2018-09-09 ENCOUNTER — TELEPHONE (OUTPATIENT)
Dept: ORTHOPEDICS | Facility: CLINIC | Age: 58
End: 2018-09-09

## 2018-09-09 NOTE — TELEPHONE ENCOUNTER
Patient calls to ask about her change in symptoms since her visit in [facility] yesterday. She now has sharp, shooting pain down the left leg since last night. Wants to know if the change in symptom changes the plan of HEP, medrol dose pack. Discussed with Dr. Jauregui today and she said OK to take the medrol dose pack, can take 48 hours to take effect and do the exercises as able. Patient reports was taking aleve and tylenol but not helping, wondering if OK to take mobic (patient already has) with the medrol dose pack, OK per Dr. Jauregui. Patient reminded to discuss use of medrol dose pack with pharmacist as she has multiple questions and never abruptly discontinue medrol dose pack and take with food. Patient says she is traveling to [place] next week and will be doing a lot of driving.

## 2018-09-10 ENCOUNTER — THERAPY VISIT (OUTPATIENT)
Dept: OCCUPATIONAL THERAPY | Facility: CLINIC | Age: 58
End: 2018-09-10
Payer: COMMERCIAL

## 2018-09-10 DIAGNOSIS — M25.641 FINGER STIFFNESS, RIGHT: ICD-10-CM

## 2018-09-10 DIAGNOSIS — M79.642 PAIN OF LEFT HAND: ICD-10-CM

## 2018-09-10 DIAGNOSIS — M25.642 STIFFNESS OF FINGER JOINT OF LEFT HAND: Primary | ICD-10-CM

## 2018-09-10 DIAGNOSIS — M79.641 PAIN OF RIGHT HAND: ICD-10-CM

## 2018-09-10 PROCEDURE — 97035 APP MDLTY 1+ULTRASOUND EA 15: CPT | Mod: GO | Performed by: OCCUPATIONAL THERAPIST

## 2018-09-10 PROCEDURE — 97110 THERAPEUTIC EXERCISES: CPT | Mod: GO | Performed by: OCCUPATIONAL THERAPIST

## 2018-09-10 PROCEDURE — 97140 MANUAL THERAPY 1/> REGIONS: CPT | Mod: GO | Performed by: OCCUPATIONAL THERAPIST

## 2018-09-10 NOTE — PROGRESS NOTES
CHIEF COMPLAINT:  Pain of the Lower Back       HISTORY OF PRESENT ILLNESS  Ms. Zarco is a pleasant 57 year old year old female who presents to clinic today with low back pain and bilateral L>R hip pain.  Torie explains that left hip is most painful.  She believes pain began after increased walking, swimming over the last few weeks.  Pain began about 3 days ago.  Pain is sharp, at lateral hips.  Worse with laying on side/pressure to regions.  She also notes pain is worse with standing and walking.  Occasional radicular type pain down left lower extremity down posterior thigh below knee.  This is sharp in nature as well.  Also notes low lumbar pain which has increased about 3 days ago as well.      Treatment has included activity modification, heat, naproxen without relief.  Of note patient has been under a significant amount of stress due to her 's recent glioblastoma diagnosis.  Has increased her walking activity, walks with her  outside.  Swims in pool for exercise.    Additional history: History of chronic low back pain, XR 2017 at [facility].    MEDICAL HISTORY  Patient Active Problem List   Diagnosis     Menopausal syndrome (hot flashes)     Multiple joint pain     Hormone replacement therapy (HRT)     Major depressive disorder, recurrent, in full remission (H)     Generalized anxiety disorder     Pain of left hand     Pain of right hand     Stiffness of finger joint of left hand     Finger stiffness, right       Current Outpatient Prescriptions   Medication Sig Dispense Refill     methylPREDNISolone (MEDROL DOSEPAK) 4 MG tablet Follow package instructions 21 tablet 0     diphenhydrAMINE (BENADRYL) 25 MG capsule Take 50 mg by mouth At Bedtime        ibuprofen (ADVIL,MOTRIN) 600 MG tablet Take 1 tablet (600 mg) by mouth every 6 hours as needed for pain (mild) 30 tablet 0     lamoTRIgine (LAMICTAL) 200 MG tablet Take 300 mg by mouth daily        meloxicam (MOBIC) 7.5 MG tablet Take 1 tablet (7.5  "mg) by mouth daily 30 tablet 1     Multiple Vitamins-Minerals (CENTRUM SILVER) per tablet Take 1 tablet by mouth       omeprazole (PRILOSEC) 20 MG capsule Take 20 mg by mouth 2 times daily        pseudoePHEDrine (SUDAFED) 30 MG tablet Take 30 mg by mouth every 4 hours as needed        Simethicone 125 MG TABS Take 125 mg by mouth as needed          No Known Allergies    Family History   Problem Relation Age of Onset     Anxiety Disorder Mother      Osteoporosis Mother      Thyroid Disease Father      Cerebrovascular Disease Other      TIA and PE     Colon Cancer Paternal Grandmother      1960s - full remission     Coronary Artery Disease Son      CHD - unrepaired aortic stenosis     Coronary Artery Disease Maternal Grandmother      Heart failure - cause of death     Depression Sister      suicide - 1985       Additional medical/Social/Surgical histories reviewed in [facility] and updated as appropriate.     REVIEW OF SYSTEMS (9/10/2018)  CONSTITUTIONAL: Denies fever and weight loss  EYES: Denies acute vision changes  ENT: Denies hearing changes or difficulty swallowing  CARDIAC: Denies chest pain or edema  RESPIRATORY: Denies dyspnea, cough or wheeze  GASTROINTESTINAL: Denies abdominal pain, nausea, vomiting  MUSCULOSKELETAL: See HPI  SKIN: Denies any recent rash or lesion  NEUROLOGICAL: Denies numbness or focal weakness  PSYCHIATRIC: BALBIR, MDD in remission  ENDOCRINE: Diagnosis of diabetes: No  HEMATOLOGY: Denies episodes of easy bleeding      PHYSICAL EXAM  Ht 1.651 m (5' 5\")  Wt 81.2 kg (179 lb)  LMP 09/15/2016 (Approximate)  BMI 29.79 kg/m2    General  - normal appearance, in no obvious distress  CV  - normal peripheral perfusion  Pulm  - normal respiratory pattern, non-labored  Musculoskeletal - lumbar spine  - stance: slow to rise and sit  - inspection: normal bone and joint alignment, no obvious scoliosis  - palpation: bilateral lumbar paravertebral spasm, most significant at L5-S1 L>R  - ROM: pain with bilateral " rotation, flexion past 60 deg, extension  - strength: lower extremities 5/5 in all planes  - special tests:  (-) straight leg raise bilaterally  (-) slump test  Musculoskeletal - bilateral hips  - stance: Relatively normal gait, no obvious leg length discrepancy, normal heel and toe walk  - inspection: no swelling or effusion,  normal bone and joint alignment, no obvious deformity  - palpation: tender over the greater trochanter on bilateral hips, gluteus medius tenderness bilaterally, external rotator pain/gluteal.  No significant SI joint pain  - ROM: Pain,weakness with active abduction on left, normal and painless flexion, extension, IR, ER, adduction  - strength: 5/5 in all planes  - special tests:  (-) CAROLYN  (-) FADIR  no pain with axial femoral load  Neuro  - no sensory or motor deficit, grossly normal coordination, normal muscle tone  Skin  - no ecchymosis, erythema, warmth, or induration, no obvious rash  Psych  - interactive, appropriate, normal mood and affect    IMAGING :   XR LUMBAR SPINE W FLEXION EXTENSION  6/9/2017 9:49 AM    INDICATION: Lumbar pain.      COMPARISON: Lumbosacral radiograph 04/18/2017    FINDINGS: In keeping with the prior lumbar spine numbering nomenclature,   hypoplastic ribs at T12 with transitional L5 which is partially sacralized   on the right. There is subtle levocurvature of the lumbar spine. 4 mm   spondylolisthesis of L4 on L5, does not change on extension but becomes 7   mm on flexion. 2 mm retrolisthesis of L2 on L3 does not change on extension   but becomes 1 mm on flexion. Vertebral body heights are maintained. No   pars defects. Mild to moderate facet arthropathy at L4-L5 and L5-S1. Mild   intervertebral disc height loss at L5-S1. Mild degenerative changes of   sacroiliac joints. Presumed occlusion devices in the pelvis as previously   suggested.      ASSESSMENT & PLAN  Ms. Zarco is a 57 year old year old female who presents to clinic today with acute on chronic low  back pain and increasing bilateral, L>R hip pain laterally.  I suspect this is related to increased walking and core/hip weakness. Underlying lumbar component of pain, possibly impingement in setting of DDD.  We discussed ice to hips and low back with heat.  Home exercises provided and encouraged light pool walking to start as exercise.  Rest from walking on pavement at this time.  Medrol dosepak. PT referral if no improvement in next week.      Diagnosis: Trochanteric bursitis, bilaterally.  Lumbar DDD.  Lumbago potentially associated with radiculopathy.     Follow up 4 weeks if persisting.  Sooner if radicular pain down legs increases. If no improvement, updated xrays, consider CSI trochanteric region.    It was a pleasure seeing Torie today.    Mohamud Castorena DO, CAQSM  Primary Care Sports Medicine

## 2018-09-28 ENCOUNTER — THERAPY VISIT (OUTPATIENT)
Dept: OCCUPATIONAL THERAPY | Facility: CLINIC | Age: 58
End: 2018-09-28
Payer: COMMERCIAL

## 2018-09-28 DIAGNOSIS — M79.642 PAIN OF LEFT HAND: Primary | ICD-10-CM

## 2018-09-28 DIAGNOSIS — M79.641 PAIN OF RIGHT HAND: ICD-10-CM

## 2018-09-28 DIAGNOSIS — M25.641 FINGER STIFFNESS, RIGHT: ICD-10-CM

## 2018-09-28 DIAGNOSIS — M25.642 STIFFNESS OF FINGER JOINT OF LEFT HAND: ICD-10-CM

## 2018-09-28 PROCEDURE — 97140 MANUAL THERAPY 1/> REGIONS: CPT | Mod: GO | Performed by: OCCUPATIONAL THERAPIST

## 2018-09-28 PROCEDURE — 97110 THERAPEUTIC EXERCISES: CPT | Mod: GO | Performed by: OCCUPATIONAL THERAPIST

## 2018-09-28 PROCEDURE — 97035 APP MDLTY 1+ULTRASOUND EA 15: CPT | Mod: GO | Performed by: OCCUPATIONAL THERAPIST

## 2018-09-28 NOTE — PROGRESS NOTES
SOAP note objective information for 9/28/2018.      Diagnosis OA flare in March with associated trigger finger  X ray involvement PIP and DIP level  Procedure: Trigger finger injection in June  Post: Diagnosis in June 2018 2 months post    Subjective changes as noted by patient: the triggering is pretty random. The finger / joint is really sore    Occupational Profile Information:  Current occupation is Self Employed  Currently working in normal job without restrictions  Job Tasks: Computer Work, Prolonged Sitting, Repetitive Tasks  Transportation: drives  Leisure activities/hobbies: Walking and swimming  Other:  is terminally ill so she is heavily involved in caretaking    Functional Outcome Measure:   See flowsheet    Objective:  Pain Level Report  VAS(0-10) 8/1/2018 9/4 9/28/2018     At Rest: 1/10 0/10 Overall much better   With Use: 7/10 7-8/10 6 to L MF     Report of Pain:  Location:  hand  Pain Quality:  Aching, Dull and Sharp  Frequency: intermittent  , but less often than before, but still high when the triggering happens  Pain is worst:  nighttime  Exacerbated by:  Use  Relieved by:  Heat. Pt did do a medrol dose pack recently  Progression:  Gradually increasing  Edema:  MILD inflammation noted to distal L palm      Sensation: WNL throughout all nerve distributions; per patient report. Note ring finger constriction is note on base of ring, which is triggering, Pt advised to remove ring.  Splint anti trigger splint added.     Stage of Stenosing Tenosynovitis (SST):   9/4/2018 9/28/2018 9/28/2018     Did have injection - no response per pt right left R L   Triggering of RING  finger 2 3 1 3   Stage 1:  Normal  Stage 2:  Uneven motion of tendon  Stage 3:  Triggering, clicking, catching  Stage 4:  Locking in extension or flexion; unlocked by active motion  Stage 5:  Locking in extension or flexion; unlocked by passive motion  Stage 6:  Finger locked in extension or flexion  Edema:  mild of the L hand  MP joints area - volar  ROM: All Fingers     AROM(PROM) 9/4/2018 9/4/2018   E/F Right Left   Ring MP  claw   PIP /92 /75   DIP     ABREU     Small MP     PIP     DIP     ABREU         Strength:  Pain-free /Pinch Test  Lat Pinch  9/28/2018   Trials R L   1   17 16 +     3 Pt Pinch  9/28/2018   Trials R L   1   15 13 +         Home Exercise Program:  IMAK Glove night      Paraffin or heat on HP  9/4/2018  Wear finger Trigger finger orthoses L PIP of RF  Passive IP flexion  Blocking IP/DIP  tracking  1st DI / C AROM for thumbs    Next Visit:  Check Trigger finger status  Check on thumb OA status

## 2018-09-28 NOTE — MR AVS SNAPSHOT
After Visit Summary   9/28/2018    Torie Zarco    MRN: [id number]           Patient Information     Date Of Birth          1960        Visit Information        Provider Department      9/28/2018 8:00 AM Rufina Pringle OT [facility]        Today's Diagnoses     Pain of left hand    -  1    Stiffness of finger joint of left hand        Finger stiffness, right        Pain of right hand           Follow-ups after your visit        Your next 10 appointments already scheduled     Oct 05, 2018  8:00 AM CDT   LUCY Hand with [facility] ([facility])    [address] [place] [address]  4th [facility] [address] [phone]              Who to contact     If you have questions or need follow up information about today's clinic visit or your schedule please contact [facility] directly at [phone].  Normal or non-critical lab and imaging results will be communicated to you by MyChart, letter or phone within 4 business days after the clinic has received the results. If you do not hear from us within 7 days, please contact the clinic through [employer] or phone. If you have a critical or abnormal lab result, we will notify you by phone as soon as possible.  Submit refill requests through [employer] or call your pharmacy and they will forward the refill request to us. Please allow 3 business days for your refill to be completed.          Additional Information About Your Visit        MyChart Information     [employer] gives you secure access to your electronic health record. If you see a primary care provider, you can also send messages to your care team and make appointments. If you have questions, please call your primary care clinic.  If you do not have a primary care provider, please call [phone] and they will assist you.        Care EveryWhere ID     This is your Care EveryWhere ID. This could be used by other organizations to  access your [place] medical records  [id number]        Your Vitals Were     Last Period                   09/15/2016 (Approximate)            Blood Pressure from Last 3 Encounters:   07/25/18 (!) 148/92   06/06/18 126/85   11/16/16 116/74    Weight from Last 3 Encounters:   09/08/18 81.2 kg (179 lb)   07/25/18 81.2 kg (179 lb)   06/20/18 81.2 kg (179 lb)              We Performed the Following     MANUAL THER TECH,1+REGIONS,EA 15 MIN     THERAPEUTIC EXERCISES     ULTRASOUND THERAPY        Primary Care Provider Office Phone # Fax #    Rea Mikayla Andrade [phone]       [facility] [address] [facility] [address]        Equal Access to Services     CHAI SCHULER : Hadii bernabe chávezo Soprasanth, waaxda luqadaha, qaybta kaalmada adeegyada, gavin dhaliwal. So [facility] [phone].    ATENCIÓN: Si habla español, tiene a zuñiga disposición servicios gratuitos de asistencia lingüística. Llame al [phone].    We comply with applicable federal civil rights laws and [place] laws. We do not discriminate on the basis of race, color, national origin, age, disability, sex, sexual orientation, or gender identity.            Thank you!     Thank you for choosing [facility] HAND THER[facility]  for your care. Our goal is always to provide you with excellent care. Hearing back from our patients is one way we can continue to improve our services. Please take a few minutes to complete the written survey that you may receive in the mail after your visit with us. Thank you!             Your Updated Medication List - Protect others around you: Learn how to safely use, store and throw away your medicines at www.disposemymeds.org.          This list is accurate as of 9/28/18  8:37 PM.  Always use your most recent med list.                   Brand Name Dispense Instructions for use Diagnosis    CENTRUM SILVER per tablet      Take 1 tablet by mouth        diphenhydrAMINE 25 MG capsule     BENADRYL     Take 50 mg by mouth At Bedtime        ibuprofen 600 MG tablet    ADVIL/MOTRIN    30 tablet    Take 1 tablet (600 mg) by mouth every 6 hours as needed for pain (mild)    S/P D&C (status post dilation and curettage)       lamoTRIgine 200 MG tablet    LaMICtal     Take 300 mg by mouth daily        meloxicam 7.5 MG tablet    MOBIC    30 tablet    Take 1 tablet (7.5 mg) by mouth daily    Primary osteoarthritis of both hands       methylPREDNISolone 4 MG tablet    MEDROL DOSEPAK    21 tablet    Follow package instructions    Trochanteric bursitis of both hips, Chronic bilateral low back pain, with sciatica presence unspecified       omeprazole 20 MG CR capsule    priLOSEC     Take 20 mg by mouth 2 times daily        pseudoePHEDrine 30 MG tablet    SUDAFED     Take 30 mg by mouth every 4 hours as needed        Simethicone 125 MG Tabs      Take 125 mg by mouth as needed

## 2018-10-05 ENCOUNTER — THERAPY VISIT (OUTPATIENT)
Dept: OCCUPATIONAL THERAPY | Facility: CLINIC | Age: 58
End: 2018-10-05
Payer: COMMERCIAL

## 2018-10-05 DIAGNOSIS — M25.641 FINGER STIFFNESS, RIGHT: ICD-10-CM

## 2018-10-05 DIAGNOSIS — M25.642 STIFFNESS OF FINGER JOINT OF LEFT HAND: ICD-10-CM

## 2018-10-05 DIAGNOSIS — M79.642 PAIN OF LEFT HAND: Primary | ICD-10-CM

## 2018-10-05 DIAGNOSIS — M79.641 PAIN OF RIGHT HAND: ICD-10-CM

## 2018-10-05 PROCEDURE — G8986 CARRY D/C STATUS: HCPCS | Mod: GO | Performed by: OCCUPATIONAL THERAPIST

## 2018-10-05 PROCEDURE — 97110 THERAPEUTIC EXERCISES: CPT | Mod: GO | Performed by: OCCUPATIONAL THERAPIST

## 2018-10-05 PROCEDURE — 97035 APP MDLTY 1+ULTRASOUND EA 15: CPT | Mod: GO | Performed by: OCCUPATIONAL THERAPIST

## 2018-10-05 PROCEDURE — 97140 MANUAL THERAPY 1/> REGIONS: CPT | Mod: GO | Performed by: OCCUPATIONAL THERAPIST

## 2018-10-05 PROCEDURE — G8984 CARRY CURRENT STATUS: HCPCS | Mod: GO | Performed by: OCCUPATIONAL THERAPIST

## 2018-10-05 PROCEDURE — G8985 CARRY GOAL STATUS: HCPCS | Mod: GO | Performed by: OCCUPATIONAL THERAPIST

## 2018-10-05 NOTE — MR AVS SNAPSHOT
After Visit Summary   10/5/2018    Torie Zarco    MRN: [id number]           Patient Information     Date Of Birth          1960        Visit Information        Provider Department      10/5/2018 8:00 AM Rufina Pringle OT [facility]        Today's Diagnoses     Pain of left hand    -  1    Stiffness of finger joint of left hand        Finger stiffness, right        Pain of right hand           Follow-ups after your visit        Who to contact     If you have questions or need follow up information about today's clinic visit or your schedule please contact [facility] HAND [facility]ER[facility] directly at [phone].  Normal or non-critical lab and imaging results will be communicated to you by [employer], letter or phone within 4 business days after the clinic has received the results. If you do not hear from us within 7 days, please contact the clinic through [employer] or phone. If you have a critical or abnormal lab result, we will notify you by phone as soon as possible.  Submit refill requests through [employer] or call your pharmacy and they will forward the refill request to us. Please allow 3 business days for your refill to be completed.          Additional Information About Your Visit        MyChart Information     [employer] gives you secure access to your electronic health record. If you see a primary care provider, you can also send messages to your care team and make appointments. If you have questions, please call your primary care clinic.  If you do not have a primary care provider, please call [phone] and they will assist you.        Care EveryWhere ID     This is your Care EveryWhere ID. This could be used by other organizations to access your [place] medical records  [id number]        Your Vitals Were     Last Period                   09/15/2016 (Approximate)            Blood Pressure from Last 3 Encounters:   07/25/18 (!) 148/92   06/06/18 126/85   11/16/16 116/74    Weight from Last  3 Encounters:   09/08/18 81.2 kg (179 lb)   07/25/18 81.2 kg (179 lb)   06/20/18 81.2 kg (179 lb)              We Performed the Following     MANUAL THER TECH,1+REGIONS,EA 15 MIN     THERAPEUTIC EXERCISES     ULTRASOUND THERAPY        Primary Care Provider Office Phone # Fax #    Rea Mikayla Andrade [phone]       [facility] [address]  [facility] [address]        Equal Access to Services     BERTHA SCHULER : Hadii aad ku hadasho Soomaali, waaxda luqadaha, qaybta kaalmada adeegyada, waxay idiin hayaan adeeg heribertoaralorene layessenia . So [facility] [phone].    ATENCIÓN: Si habla espj carlos, tiene a zuñiga disposición servicios gratuitos de asistencia lingüística. [facility] [phone].    We comply with applicable federal civil rights laws and [place] laws. We do not discriminate on the basis of race, color, national origin, age, disability, sex, sexual orientation, or gender identity.            Thank you!     Thank you for choosing [facility] HAND [facility]ERAP[facility]  for your care. Our goal is always to provide you with excellent care. Hearing back from our patients is one way we can continue to improve our services. Please take a few minutes to complete the written survey that you may receive in the mail after your visit with us. Thank you!             Your Updated Medication List - Protect others around you: Learn how to safely use, store and throw away your medicines at www.disposemymeds.org.          This list is accurate as of 10/5/18 12:49 PM.  Always use your most recent med list.                   Brand Name Dispense Instructions for use Diagnosis    CENTRUM SILVER per tablet      Take 1 tablet by mouth        diphenhydrAMINE 25 MG capsule    BENADRYL     Take 50 mg by mouth At Bedtime        ibuprofen 600 MG tablet    ADVIL/MOTRIN    30 tablet    Take 1 tablet (600 mg) by mouth every 6 hours as needed for pain (mild)    S/P D&C (status post dilation and curettage)       lamoTRIgine 200 MG tablet     LaMICtal     Take 300 mg by mouth daily        meloxicam 7.5 MG tablet    MOBIC    30 tablet    Take 1 tablet (7.5 mg) by mouth daily    Primary osteoarthritis of both hands       methylPREDNISolone 4 MG tablet    MEDROL DOSEPAK    21 tablet    Follow package instructions    Trochanteric bursitis of both hips, Chronic bilateral low back pain, with sciatica presence unspecified       omeprazole 20 MG CR capsule    priLOSEC     Take 20 mg by mouth 2 times daily        pseudoePHEDrine 30 MG tablet    SUDAFED     Take 30 mg by mouth every 4 hours as needed        Simethicone 125 MG Tabs      Take 125 mg by mouth as needed

## 2018-10-05 NOTE — PROGRESS NOTES
Discharge Note - Hand Therapy    Current Date:  10/5/2018  Initial Evaluation Date:  8/1/18  Reporting period is from 9/4/18 to 10/5/2018  Number of Visits: 9    Diagnosis OA flare in March with associated trigger finger  X ray involvement PIP and DIP level  Procedure: Trigger finger injection in June  Post: Diagnosis in June 2018 2 months post    Subjective:   Subjective changes as noted by patient:  I even went 24 hours without a brace or gloves, the R fingers were a little sore; but it was not too bad. triggering x2 on the L RF lightly      Occupational Profile Information:  Current occupation is Self Employed  Currently working in normal job without restrictions  Job Tasks: Computer Work, Prolonged Sitting, Repetitive Tasks  Transportation: drives  Leisure activities/hobbies: Walking and swimming  Other:  is terminally ill so she is heavily involved in caretaking    Functional Outcome Measure:   See flowsheet    Objective:  Pain Level Report  VAS(0-10) 8/1/2018 9/4 9/28/2018   10/5/2018     At Rest: 1/10 0/10 Overall much better Mild pain, managing much better   With Use: 7/10 7-8/10 6 to L MF Not waking up in pain any more     Report of Pain:  Location:  hand  Pain Quality:  Aching, Dull and Sharp  Frequency: intermittent  , but less often than before, but still high when the triggering happens  Pain is worst:  nighttime  Exacerbated by:  Use  Relieved by:  Heat. Pt did do a medrol dose pack recently  Progression:  Gradually increasing  Edema:  MILD inflammation noted to distal L palm      Sensation: WNL throughout all nerve distributions; per patient report. Note ring finger constriction is note on base of ring, which is triggering, Pt advised to remove ring.  Splint anti trigger splint added.     Stage of Stenosing Tenosynovitis (SST):   9/4/2018  9/28/2018   9/28/2018   10/5/2018     Did have injection - no response per pt right left R L L   Triggering of RING  finger 2 3 1 3 1-3   Stage 1:   Normal  Stage 2:  Uneven motion of tendon  Stage 3:  Triggering, clicking, catching  Stage 4:  Locking in extension or flexion; unlocked by active motion  Stage 5:  Locking in extension or flexion; unlocked by passive motion  Stage 6:  Finger locked in extension or flexion  Edema:  mild of the L hand MP joints area - volar  ROM: All Fingers     AROM(PROM) 9/4/2018 9/4/2018   E/F Right Left   Ring MP  claw   PIP /92 /75   DIP     ABREU     Small MP     PIP     DIP     ABREU         Strength:  Pain-free /Pinch Test  Lat Pinch  9/28/2018   Trials R L   1   17 16 +     3 Pt Pinch  9/28/2018   Trials R L   1   15 13 +     Assessment:  Response to therapy has been improvement to:  Pain:  frequency is less, intensity of pain is decreased and duration of pain is decreased  Appropriateness of Rx I have re-evaluated this patient and find that the nature, scope, duration and intensity of the therapy is appropriate for the medical condition of the patient.  Overall Assessment:  Patient's symptoms are resolving.  Patient is progressing well.  Patient has met Short and Long Term Treatment Goals.  STG/LTG:  See goal sheet for details and updates.    Plan:  Frequency/Duration:  Discharge from Hand Therapy; continue home program.    Recommendations for Home Program  Home Exercise Program:  IMAK Glove night / day    Paraffin or heat on HP  9/4/2018  Wear finger Trigger finger orthoses L PIP of RF  Passive IP flexion  Blocking IP/DIP  tracking  1st DI / C RB resistance for thumbs  Wearing iMak gloves

## 2019-02-15 ENCOUNTER — HEALTH MAINTENANCE LETTER (OUTPATIENT)
Age: 59
End: 2019-02-15

## 2019-04-28 ENCOUNTER — APPOINTMENT (OUTPATIENT)
Dept: GENERAL RADIOLOGY | Facility: CLINIC | Age: 59
End: 2019-04-28
Attending: EMERGENCY MEDICINE
Payer: COMMERCIAL

## 2019-04-28 ENCOUNTER — HOSPITAL ENCOUNTER (EMERGENCY)
Facility: CLINIC | Age: 59
Discharge: HOME | End: 2019-04-28
Attending: EMERGENCY MEDICINE | Admitting: EMERGENCY MEDICINE
Payer: COMMERCIAL

## 2019-04-28 VITALS
OXYGEN SATURATION: 99 % | DIASTOLIC BLOOD PRESSURE: 79 MMHG | RESPIRATION RATE: 18 BRPM | HEART RATE: 79 BPM | TEMPERATURE: 97.6 F | SYSTOLIC BLOOD PRESSURE: 133 MMHG | WEIGHT: 182.98 LBS | BODY MASS INDEX: 30.45 KG/M2

## 2019-04-28 DIAGNOSIS — R07.89 ATYPICAL CHEST PAIN: ICD-10-CM

## 2019-04-28 LAB
ANION GAP SERPL CALCULATED.3IONS-SCNC: 8 MMOL/L (ref 3–14)
BASOPHILS # BLD AUTO: 0.1 10E9/L (ref 0–0.2)
BASOPHILS NFR BLD AUTO: 0.9 %
BUN SERPL-MCNC: 15 MG/DL (ref 7–30)
CALCIUM SERPL-MCNC: 9.1 MG/DL (ref 8.5–10.1)
CHLORIDE SERPL-SCNC: 106 MMOL/L (ref 94–109)
CO2 SERPL-SCNC: 27 MMOL/L (ref 20–32)
CREAT SERPL-MCNC: 0.87 MG/DL (ref 0.52–1.04)
D DIMER PPP FEU-MCNC: 0.3 UG/ML FEU (ref 0–0.5)
DIFFERENTIAL METHOD BLD: NORMAL
EOSINOPHIL # BLD AUTO: 0.1 10E9/L (ref 0–0.7)
EOSINOPHIL NFR BLD AUTO: 1.8 %
ERYTHROCYTE [DISTWIDTH] IN BLOOD BY AUTOMATED COUNT: 13 % (ref 10–15)
GFR SERPL CREATININE-BSD FRML MDRD: 74 ML/MIN/{1.73_M2}
GLUCOSE SERPL-MCNC: 132 MG/DL (ref 70–99)
HCT VFR BLD AUTO: 43.6 % (ref 35–47)
HGB BLD-MCNC: 14.4 G/DL (ref 11.7–15.7)
IMM GRANULOCYTES # BLD: 0 10E9/L (ref 0–0.4)
IMM GRANULOCYTES NFR BLD: 0.5 %
LYMPHOCYTES # BLD AUTO: 2.3 10E9/L (ref 0.8–5.3)
LYMPHOCYTES NFR BLD AUTO: 34.8 %
MCH RBC QN AUTO: 28.4 PG (ref 26.5–33)
MCHC RBC AUTO-ENTMCNC: 33 G/DL (ref 31.5–36.5)
MCV RBC AUTO: 86 FL (ref 78–100)
MONOCYTES # BLD AUTO: 0.6 10E9/L (ref 0–1.3)
MONOCYTES NFR BLD AUTO: 9.8 %
NEUTROPHILS # BLD AUTO: 3.4 10E9/L (ref 1.6–8.3)
NEUTROPHILS NFR BLD AUTO: 52.2 %
NRBC # BLD AUTO: 0 10*3/UL
NRBC BLD AUTO-RTO: 0 /100
PLATELET # BLD AUTO: 297 10E9/L (ref 150–450)
POTASSIUM SERPL-SCNC: 3.8 MMOL/L (ref 3.4–5.3)
RBC # BLD AUTO: 5.07 10E12/L (ref 3.8–5.2)
SODIUM SERPL-SCNC: 141 MMOL/L (ref 133–144)
TROPONIN I BLD-MCNC: 0 UG/L (ref 0–0.08)
TROPONIN I SERPL-MCNC: <0.015 UG/L (ref 0–0.04)
TROPONIN I SERPL-MCNC: <0.015 UG/L (ref 0–0.04)
WBC # BLD AUTO: 6.6 10E9/L (ref 4–11)

## 2019-04-28 PROCEDURE — 99285 EMERGENCY DEPT VISIT HI MDM: CPT | Mod: 25 | Performed by: EMERGENCY MEDICINE

## 2019-04-28 PROCEDURE — 80048 BASIC METABOLIC PNL TOTAL CA: CPT | Performed by: EMERGENCY MEDICINE

## 2019-04-28 PROCEDURE — 85379 FIBRIN DEGRADATION QUANT: CPT | Performed by: EMERGENCY MEDICINE

## 2019-04-28 PROCEDURE — 85025 COMPLETE CBC W/AUTO DIFF WBC: CPT | Performed by: EMERGENCY MEDICINE

## 2019-04-28 PROCEDURE — 93005 ELECTROCARDIOGRAM TRACING: CPT | Mod: 76 | Performed by: EMERGENCY MEDICINE

## 2019-04-28 PROCEDURE — 84484 ASSAY OF TROPONIN QUANT: CPT

## 2019-04-28 PROCEDURE — 84484 ASSAY OF TROPONIN QUANT: CPT | Performed by: EMERGENCY MEDICINE

## 2019-04-28 PROCEDURE — 25000132 ZZH RX MED GY IP 250 OP 250 PS 637: Performed by: EMERGENCY MEDICINE

## 2019-04-28 PROCEDURE — 71046 X-RAY EXAM CHEST 2 VIEWS: CPT

## 2019-04-28 PROCEDURE — 93005 ELECTROCARDIOGRAM TRACING: CPT | Performed by: EMERGENCY MEDICINE

## 2019-04-28 PROCEDURE — 93010 ELECTROCARDIOGRAM REPORT: CPT | Mod: Z6 | Performed by: EMERGENCY MEDICINE

## 2019-04-28 PROCEDURE — 93010 ELECTROCARDIOGRAM REPORT: CPT | Mod: 76 | Performed by: EMERGENCY MEDICINE

## 2019-04-28 RX ORDER — CALCIUM CARBONATE 500 MG/1
1 TABLET, CHEWABLE ORAL 2 TIMES DAILY
COMMUNITY

## 2019-04-28 RX ORDER — ASPIRIN 81 MG/1
324 TABLET, CHEWABLE ORAL ONCE
Status: COMPLETED | OUTPATIENT
Start: 2019-04-28 | End: 2019-04-28

## 2019-04-28 RX ADMIN — ASPIRIN 81 MG 324 MG: 81 TABLET ORAL at 14:16

## 2019-04-28 ASSESSMENT — ENCOUNTER SYMPTOMS
BACK PAIN: 0
ABDOMINAL PAIN: 0
FEVER: 0
FLANK PAIN: 0
SHORTNESS OF BREATH: 1
COUGH: 0
CHILLS: 0
CHEST TIGHTNESS: 1

## 2019-04-28 NOTE — ED AVS SNAPSHOT
[facility], [place], Emergency Department  [address] [place] [address]  Phone:  [phone]                                    Torie Zarco   MRN: [id number]    Department:  [facility], Emergency Department   Date of Visit:  4/28/2019           After Visit Summary Signature Page    I have received my discharge instructions, and my questions have been answered. I have discussed any challenges I see with this plan with the nurse or doctor.    ..........................................................................................................................................  Patient/Patient Representative Signature      ..........................................................................................................................................  Patient Representative Print Name and Relationship to Patient    ..................................................               ................................................  Date                                   Time    ..........................................................................................................................................  Reviewed by Signature/Title    ...................................................              ..............................................  Date                                               Time          22EPIC Rev 08/18

## 2019-04-28 NOTE — ED PROVIDER NOTES
History     Chief Complaint   Patient presents with     Shortness of Breath     The history is provided by the patient and medical records.     Torie Zarco is a 58-year-old female presenting with dyspnea.  She states that approximately 12:05 today she was standing in her kitchen when she had the sensation of feeling short of breath, suddenly.  She thought his was perhaps an acid reflux episode so sat down and took an over-the-counter antacid medication, tried to eat some things, and when she didn't felt better came here to the hospital.  Associated with feeling generally heavy in her body there was some mild chest tightness associated with it.  No cough, no chest pain.  She does not have a history of asthma or COPD; non-smoker.  No personal history of coronary artery disease or pulmonary hypertension, pulmonary embolism or other hypercoagulable states.  No hypertension or hypercholesterolemia or diabetes.  She does have a family history of congenital heart disease with her son having multiple congenital abnormalities.  She also had a grandfather who had heart failure. No coronary artery disease in her parents or siblings at an early age.  She has been able to tolerate activity specifically going on walks regularly with her  and did not have the onset of symptoms today with activity.  No recent cough or fever or URI symptoms.  She does state that she is been feeling a little bit blue recently; she has a history of anxiety and depression and she is been slightly stressed because her  is undergoing care for a brain tumor.  She did not notice an obvious anxiety trigger for today's symptoms.  No abdominal pain, back pain, flank pain, urinary symptoms fever, chills, leg pain, or swelling.    I have reviewed the Medications, Allergies, Past Medical and Surgical History, and Social History in the [employer] system.    Past Medical History:   Diagnosis Date     Abnormal Pap smear 2013    Unusually looking cells on  uterus, but no cancer on exam     Acid reflux      BALBIR (generalised anxiety disorder)      Gestational diabetes      MDD (major depressive disorder), recurrent, in full remission (H)      PONV (postoperative nausea and vomiting)        Past Surgical History:   Procedure Laterality Date     AS HYSTEROS W PERMANENT FALLOPAIN IMPLANT  2009     BIOPSY  2016    Ck unusual bleeding; no issue discovered     BUNIONECTOMY  2012    left     COLONOSCOPY  2007    normal     DILATION AND CURETTAGE, HYSTEROSCOPY DIAGNOSTIC, COMBINED N/A 10/26/2016    Procedure: COMBINED DILATION AND CURETTAGE, HYSTEROSCOPY DIAGNOSTIC;  Surgeon: Zainab Freire MD;  Location: [facility] OR     GYN SURGERY      Essure procedure     HAND SURGERY  2004    right hand fx of ring finger      ORTHOPEDIC SURGERY      hand surgery right     ORTHOPEDIC SURGERY      bunionectomy left foot       Family History   Problem Relation Age of Onset     Anxiety Disorder Mother      Osteoporosis Mother      Thyroid Disease Father      Cerebrovascular Disease Other         TIA and PE     Colon Cancer Paternal Grandmother         1960s - full remission     Coronary Artery Disease Son         CHD - unrepaired aortic stenosis     Coronary Artery Disease Maternal Grandmother         Heart failure - cause of death     Depression Sister         suicide - 1985       Social History     Tobacco Use     Smoking status: Never Smoker     Smokeless tobacco: Never Used   Substance Use Topics     Alcohol use: Yes     Alcohol/week: 0.0 oz     Comment: light - typically 5-7x/wk (glass of wine in any day)     Review of Systems   Constitutional: Negative for chills and fever.   Respiratory: Positive for chest tightness (mild) and shortness of breath. Negative for cough.    Cardiovascular: Negative for chest pain and leg swelling.   Gastrointestinal: Negative for abdominal pain.   Genitourinary: Negative for flank pain.   Musculoskeletal: Negative for back pain.        Negative for leg  pain   All other systems reviewed and are negative.       Physical Exam   BP: (!) 158/93  Pulse: 111  Heart Rate: 78  Temp: 97.6  F (36.4  C)  Resp: 18  Weight: 83 kg (182 lb 15.7 oz)  SpO2: 95 %      Physical Exam   Gen:A&Ox3, no acute distress  HEENT:PERRL, no facial tenderness or wounds, head atraumatic, oropharynx clear, mucous membranes moist, TMs clear bilaterally  Neck:no bony tenderness or step offs, no JVD, trachea midline  Back: no CVA tenderness, no midline bony tenderness  CV:RRR without murmurs  PULM:Clear to auscultation bilaterally  Abd:soft, nontender, nondistended. Bowel sounds present and normal  UE:No traumatic injuries, skin normal  LE:no traumatic injuries, skin normal, no LE edema or calf tenderness  Neuro:CN II-XII intact, strength 5/5 throughout, gait stable.   Skin: no rashes or ecchymoses      ED Course        Procedures             EKG Interpretation:      Interpreted by April Lay  Time reviewed: 1:41PM  Symptoms at time of EKG: chest pressure   Rhythm: sinus tachycardia  Rate: 104  Axis: normal  Ectopy: none  Conduction: normal  ST Segments/ T Waves: No ST-T wave changes  Q Waves: none  Comparison to prior: Unchanged from October 24, 2016 other than faster heart rate.     Clinical Impression: sinus tachycardia.            EKG Interpretation:      Interpreted by April Lay  Time reviewed: 4:01pm  Symptoms at time of EKG: body heaviness and chest tightness, resolved   Rhythm: normal sinus   Rate: 79  Axis: NORMAL  Ectopy: none  Conduction: normal  ST Segments/ T Waves: No ST-T wave changes  Q Waves: none  Comparison to prior: unchanged    Clinical Impression: normal EKG        Critical Care time:  none    Labs Ordered and Resulted from Time of ED Arrival Up to the Time of Departure from the ED   BASIC METABOLIC PANEL - Abnormal; Notable for the following components:       Result Value    Glucose 132 (*)     All other components within normal limits   CBC WITH PLATELETS  DIFFERENTIAL   D DIMER QUANTITATIVE   TROPONIN I   TROPONIN I   ISTAT TROPONIN NURSING POCT   TROPONIN POCT         Assessments & Plan (with Medical Decision Making)   58-year-old female presenting with an episode of chest tightness and dyspnea potential for atypical chest pain versus acid reflux. Differential diagnosis also includes pneumothorax, or pulmonary embolism.  Her physical exam is notable for a soft systolic murmur at the right upper sternal border that she had not been aware of having before.  EKG shows sinus tachycardia at the rate of 104, no ischemic abnormalities.  IV access was obtained and laboratory testing done.  This shows normal CBC and metabolic panel.  Troponin negative and d-dimer negative.  Blood sugar 132.  I reviewed a recent TSH testing done within the last year that was within normal limits. A chest x-ray today was performed and shows no pneumothorax, normal heart size, normal mediastinum, no pleural effusions, no air under the diaphragm.  Heart rate was monitored on telemetry; no arrhythmias were noted.  Sinus tachycardia that was present on arrival resolved.  She is feeling improved.  Repeat EKG unchanged and troponin at 3 hours continues to be negative. She currently has a HEART score of 1 due to her age, otherwise an atypical story for acute coronary syndrome. I will plan for her to follow-up with her outpatient providers for an echocardiogram for further work-up of her systolic heart murmur, which I presume is an aortic flow murmur.  Patient and I discussed outpatient stress testing and instructed her to return to the Emergency Department with any exertional symptoms or return of chest pain.    I have reviewed the nursing notes.    I have reviewed the findings, diagnosis, plan and need for follow up with the patient.       Medication List      There are no discharge medications for this visit.         Final diagnoses:   Atypical chest pain   Keven GONZALEZ, am serving as a  trained medical scribe to document services personally performed by April Lay MD, based on the provider's statements to me.      I, April Lay MD, was physically present and have reviewed and verified the accuracy of this note documented by Keven Mendoza.     4/28/2019   [facility], [place], EMERGENCY DEPARTMENT    MD IVONNE Ferguson Katrina Anne, MD  04/28/19 [id number]

## 2019-04-28 NOTE — ED TRIAGE NOTES
Pt presents ambulatory to triage from home. Pt states about 1 hr ago began having SOB, extremity  heaviness. Pt states also felt dizzy. Pt also appears anxious, with rapid speech. Pt states has hx anxiety and panic attacks.

## 2019-04-28 NOTE — DISCHARGE INSTRUCTIONS
Thank you for coming to the [facility] Emergency Department.     OK to continue over the counter antacids for chest discomfort.     Heart testing today does not show signs of a heart attack.     Please contact your primary care clinic to arrange follow up of your heart murmur.     Please return to the ER for: severe chest pain or shortness of breath, passing out spells, chest discomfort with activities (like walking, using stairs or other exercise)

## 2019-04-29 LAB
INTERPRETATION ECG - MUSE: NORMAL
INTERPRETATION ECG - MUSE: NORMAL

## 2019-08-02 ENCOUNTER — TELEPHONE (OUTPATIENT)
Dept: PSYCHIATRY | Facility: CLINIC | Age: 59
End: 2019-08-02

## 2019-08-02 NOTE — TELEPHONE ENCOUNTER
PSYCHIATRY CLINIC PHONE INTAKE     SERVICES REQUESTED / INTERESTED IN          Med Management    Presenting Problem and Brief History                              What would you like to be seen for? (brief description):  Pt was diagnosed years ago. She's currently taking lamictal 300mg a day. She's been taking this medication since 2008, about 11 years. The medication works well managing her symptoms. She had a change in her life, when her  was diagnosed with marie cancer. She agrees that's there are certain stressors that come from that. She wants to make sure that her medication is still the most appropriate for her during this  stressful time. She was seeing [facility] psychiatrist, but her location kept moving making less accessible. She also saw , psychiatrist but it was a bad fit, wanting to add medications. More recently it was , who didn't believe in medication. So the most recent prescription came from her PCP, but her PCP would like for the pt to be seen by a psychiatrist for long term care. Sleep in general is pretty good. She hasn't gone into a deep depression. She knows she cycles between happiness and depression, and her medication keeps her feeling in control.     Have you received a mental health diagnosis? Yes   Which one (s): Generalized Anxiety Depression Disorder  Is there any history of developmental delay?  No   Are you currently seeing a mental health provider?  Yes            Who / month last seen:  Olesya Kovacs, Therapist at [facility] in [place], [place]   Do you have mental health records elsewhere?  Yes  Will you sign a release so we can obtain them?  Yes    Have you ever been hospitalized for psychiatric reasons?  No  Describe:  NA    Do you have current thoughts of self-harm?  No    Do you currently have thoughts of harming others?  No       Substance Use History     Do you have any history of alcohol / illicit drug use?  No  Describe:  NA  Have you ever  received treatment for this?  No    Describe:  NA     Social History     Does the patient have a guardian?  No    Name / number: NA  Have you had an ACT team in last 12 months?  No  Describe: NA   Do you have any current or past legal issues?  No  Describe: NA   OK to leave a detailed voicemail?  Yes    Medical/ Surgical History                                   Patient Active Problem List   Diagnosis     Menopausal syndrome (hot flashes)     Multiple joint pain     Hormone replacement therapy (HRT)     Major depressive disorder, recurrent, in full remission (H)     Generalized anxiety disorder          Medications             Current Outpatient Medications   Medication Sig Dispense Refill     calcium carbonate (TUMS) 500 MG chewable tablet Take 1 chew tab by mouth 2 times daily       diphenhydrAMINE (BENADRYL) 25 MG capsule Take 50 mg by mouth At Bedtime        ibuprofen (ADVIL,MOTRIN) 600 MG tablet Take 1 tablet (600 mg) by mouth every 6 hours as needed for pain (mild) 30 tablet 0     lamoTRIgine (LAMICTAL) 200 MG tablet Take 300 mg by mouth daily        meloxicam (MOBIC) 7.5 MG tablet Take 1 tablet (7.5 mg) by mouth daily 30 tablet 1     Multiple Vitamins-Minerals (CENTRUM SILVER) per tablet Take 1 tablet by mouth       pseudoePHEDrine (SUDAFED) 30 MG tablet Take 30 mg by mouth every 4 hours as needed        Simethicone 125 MG TABS Take 125 mg by mouth as needed            DISPOSITION     8/2/19 Intake completed. Prefer NP. Adding to resident/NP YI Eldridge,

## 2020-03-10 ENCOUNTER — HEALTH MAINTENANCE LETTER (OUTPATIENT)
Age: 60
End: 2020-03-10

## 2020-10-28 ENCOUNTER — OFFICE VISIT (OUTPATIENT)
Dept: ORTHOPEDICS | Facility: CLINIC | Age: 60
End: 2020-10-28
Payer: COMMERCIAL

## 2020-10-28 VITALS — WEIGHT: 175 LBS | BODY MASS INDEX: 29.16 KG/M2 | HEIGHT: 65 IN

## 2020-10-28 DIAGNOSIS — M79.642 BILATERAL HAND PAIN: Primary | ICD-10-CM

## 2020-10-28 DIAGNOSIS — M79.641 BILATERAL HAND PAIN: Primary | ICD-10-CM

## 2020-10-28 PROCEDURE — 99213 OFFICE O/P EST LOW 20 MIN: CPT | Performed by: FAMILY MEDICINE

## 2020-10-28 ASSESSMENT — MIFFLIN-ST. JEOR: SCORE: 1374.67

## 2020-10-28 NOTE — PROGRESS NOTES
"      SPORTS & ORTHOPEDIC WALK-IN FOLLOW-UP VISIT 10/28/2020    Interval History:     Follow up reason: Bilateral hand pain    Date of injury/onset: Chronic    Date last seen: 7/25/18    Following Therapeutic Plan: Yes     Pain: Worsening    Function: Unchanged    Interval History: She was here about 2 years ago and the pain eventually went away with hand therapy. Within the last month she has started to notice an increase in pain in her right hand. She has also started to notice clicking with movement of her right middle finger. She hasn't increased any activities with her hands so she is unsure what aggravated it. Has tried splint and compression sleeve. Has tried ice and heat. Has been taking naproxen prn and using topical cbd.     Medical History:    Any recent changes to your medical history? No    Any new medication prescribed since last visit? No    Review of Systems:    Do you have fever, chills, weight loss? No    Do you have any vision problems? No    Do you have any chest pain or edema? No    Do you have any shortness of breath or wheezing?  No    Do you have stomach problems? No    Do you have any numbness or focal weakness? Yes, weakness in ankles     Do you have diabetes? No    Do you have problems with bleeding or clotting? No    Do you have an rashes or other skin lesions? No           Past Medical History, Current Medications, and Allergies are reviewed in the electronic medical record as appropriate.       EXAM:Ht 1.651 m (5' 5\")   Wt 79.4 kg (175 lb)   LMP 09/15/2016 (Approximate)   BMI 29.12 kg/m      General: Alert, pleasant, no distress  Bilateral hands: No significant soft tissue swelling, ecchymosis, deformity.  Mild tenderness to palpation over the volar aspect of the third metacarpal head on the right.  There is clicking noted in this area with passive flexion and extension of the PIP joint.  No other locking or catching noted.  Sensation intact, strength intact.  Cap refill " brisk.    Imaging: No new imaging      Assessment: Patient is a 60 year old female with recurrent bilateral hand pain worse over the past month.  She is having some mild triggering in the right middle finger as well.  She has some mild underlying osteoarthritis and there has been some concern for inflammatory arthritis in the past though symptoms have been relatively controlled over the course of the past 2 years.  Increased stress over the past few months may be contributing to her pain.    Recommendations:   Reviewed imaging and assessment with the patient in detail  Have recommended a regular course of naproxen once or twice daily for 2 to 4 weeks  Recommended follow-up with hand therapy as this had been of significant benefit her in the past  Provided with oval 8 splint for her right middle finger and discussed its use.  If naproxen proves to be ineffective or pain does not resolve after 1 month, could consider a prednisone taper.    Saied Henning MD

## 2020-10-28 NOTE — LETTER
"    10/28/2020         RE: Torie Zarco  [address]  [place] [address]        Dear Colleague,    Thank you for referring your patient, Torie Zarco, to the [facility] [place]. Please see a copy of my visit note below.          SPORTS & ORTHOPEDIC WALK-IN FOLLOW-UP VISIT 10/28/2020    Interval History:     Follow up reason: Bilateral hand pain    Date of injury/onset: Chronic    Date last seen: 7/25/18    Following Therapeutic Plan: Yes     Pain: Worsening    Function: Unchanged    Interval History: She was here about 2 years ago and the pain eventually went away with hand therapy. Within the last month she has started to notice an increase in pain in her right hand. She has also started to notice clicking with movement of her right middle finger. She hasn't increased any activities with her hands so she is unsure what aggravated it. Has tried splint and compression sleeve. Has tried ice and heat. Has been taking naproxen prn and using topical cbd.     Medical History:    Any recent changes to your medical history? No    Any new medication prescribed since last visit? No    Review of Systems:    Do you have fever, chills, weight loss? No    Do you have any vision problems? No    Do you have any chest pain or edema? No    Do you have any shortness of breath or wheezing?  No    Do you have stomach problems? No    Do you have any numbness or focal weakness? Yes, weakness in ankles     Do you have diabetes? No    Do you have problems with bleeding or clotting? No    Do you have an rashes or other skin lesions? No           Past Medical History, Current Medications, and Allergies are reviewed in the electronic medical record as appropriate.       EXAM:Ht 1.651 m (5' 5\")   Wt 79.4 kg (175 lb)   LMP 09/15/2016 (Approximate)   BMI 29.12 kg/m      General: Alert, pleasant, no distress  Bilateral hands: No significant soft tissue swelling, ecchymosis, deformity.  " Mild tenderness to palpation over the volar aspect of the third metacarpal head on the right.  There is clicking noted in this area with passive flexion and extension of the PIP joint.  No other locking or catching noted.  Sensation intact, strength intact.  Cap refill brisk.    Imaging: No new imaging      Assessment: Patient is a 60 year old female with recurrent bilateral hand pain worse over the past month.  She is having some mild triggering in the right middle finger as well.  She has some mild underlying osteoarthritis and there has been some concern for inflammatory arthritis in the past though symptoms have been relatively controlled over the course of the past 2 years.  Increased stress over the past few months may be contributing to her pain.    Recommendations:   Reviewed imaging and assessment with the patient in detail  Have recommended a regular course of naproxen once or twice daily for 2 to 4 weeks  Recommended follow-up with hand therapy as this had been of significant benefit her in the past  Provided with oval 8 splint for her right middle finger and discussed its use.  If naproxen proves to be ineffective or pain does not resolve after 1 month, could consider a prednisone taper.    Saeid Henning MD

## 2020-11-03 ENCOUNTER — THERAPY VISIT (OUTPATIENT)
Dept: OCCUPATIONAL THERAPY | Facility: CLINIC | Age: 60
End: 2020-11-03
Payer: COMMERCIAL

## 2020-11-03 DIAGNOSIS — M79.642 BILATERAL HAND PAIN: Primary | ICD-10-CM

## 2020-11-03 DIAGNOSIS — M79.641 BILATERAL HAND PAIN: Primary | ICD-10-CM

## 2020-11-03 PROCEDURE — 97760 ORTHOTIC MGMT&TRAING 1ST ENC: CPT | Mod: GO | Performed by: OCCUPATIONAL THERAPIST

## 2020-11-03 PROCEDURE — 97110 THERAPEUTIC EXERCISES: CPT | Mod: GO | Performed by: OCCUPATIONAL THERAPIST

## 2020-11-03 PROCEDURE — 97165 OT EVAL LOW COMPLEX 30 MIN: CPT | Mod: GO | Performed by: OCCUPATIONAL THERAPIST

## 2020-11-03 NOTE — PROGRESS NOTES
Hand Therapy Initial Evaluation    Current Date:  11/3/2020    Diagnosis: Bilateral hand pain (R>L); right long trigger finger  DOI: 10/28/2020 (Referral date)  Referring physician: Saeid Henning MD      Subjective:  Torie Zarco is a 60 year old female.    Patient reports symptoms of the bilateral hands which occurred due to unknown. Patient experienced similar symptoms about two years ago which resolved with hand therapy. Since re-onset symptoms are gradually getting worse.  General health as reported by patient is good.  Pertinent medical history includes:  Past Medical History:   Diagnosis Date     Abnormal Pap smear 2013    Unusually looking cells on uterus, but no cancer on exam     Acid reflux      BALBIR (generalised anxiety disorder)      Gestational diabetes      MDD (major depressive disorder), recurrent, in full remission (H)      PONV (postoperative nausea and vomiting)      Past Surgical History:   Procedure Laterality Date     AS HYSTEROS W PERMANENT FALLOPAIN IMPLANT  2009     BIOPSY  2016    Ck unusual bleeding; no issue discovered     BUNIONECTOMY  2012    left     COLONOSCOPY  2007    normal     DILATION AND CURETTAGE, HYSTEROSCOPY DIAGNOSTIC, COMBINED N/A 10/26/2016    Procedure: COMBINED DILATION AND CURETTAGE, HYSTEROSCOPY DIAGNOSTIC;  Surgeon: Zainab Freire MD;  Location: [facility] OR     GYN SURGERY      Essure procedure     HAND SURGERY  2004    right hand fx of ring finger      ORTHOPEDIC SURGERY      hand surgery right     ORTHOPEDIC SURGERY      bunionectomy left foot     Current Outpatient Medications   Medication     calcium carbonate (TUMS) 500 MG chewable tablet     diphenhydrAMINE (BENADRYL) 25 MG capsule     lamoTRIgine (LAMICTAL) 200 MG tablet     Multiple Vitamins-Minerals (CENTRUM SILVER) per tablet     pseudoePHEDrine (SUDAFED) 30 MG tablet     Simethicone 125 MG TABS     Current Facility-Administered Medications   Medication     dexamethasone (DECADRON) injection 2 mg      "dexamethasone (DECADRON) injection 4 mg     lidocaine (PF) (XYLOCAINE) 1 % injection 0.5 mL      No Known Allergies      Current occupation is \"essentially retired,\" a historical researcher, 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    Previous treatment: Hand therapy in 2018 for OA and trigger finger  Barriers include:none  Mobility: No difficulty  Transportation: drives  Leisure activities/hobbies: Sewing (machine), painting  Other: Increased stress seems to have exacerbated pain; family history of osteoarthritis and rheumatoid arthritis    Functional Outcome Measure:   Upper Extremity Functional Index Score:  SCORE:   Column Totals: /80: 40   (A lower score indicates greater disability.)      Objective:  Pain Level (Scale 0-10)   11/3/2020   At Rest 2-3/10   With Use 3-4/10     Pain Description  Date 11/3/2020   Location MCP, PIP, and DIP joints   Pain Quality Aching and Sharp   Frequency constant     Pain is worst Worst in morning and evening, can wake from sleep at night   Exacerbated by Use   Relieved by Heat   Progression Gradually worsening       Edema (Circumference measured in cm)   11/3/2020 11/3/2020   Long Left Right   P1 5.6 5.8   PIP 5.5 5.6   P2 5.2 5.2     Sensation   WNL throughout all nerve distributions; per patient report    ROM  Able to make a flat fist with right hand with triggering of long finger. Able to make a full composite fist with left hand.    Stage of Stenosing Tenosynovitis (SST)     11/3/2020   Long Finger Stage 3   Stage 1:  Normal  Stage 2:  Uneven motion of tendon  Stage 3:  Triggering, clicking, catching  Stage 4:  Locking in extension or flexion; unlocked by active motion  Stage 5:  Locking in extension or flexion; unlocked by passive motion  Stage " 6:  Finger locked in extension or flexion    Strength   (Measured in pounds)  Pain Report: - none  + mild    ++ moderate    +++ severe    11/3/2020 11/3/2020   Trials Left Right   1 29 25   * Pain-free  strength    Lat Pinch 11/3/2020 11/3/2020   Trials Left Right   1 18 11     3 Pt Pinch 11/3/2020 11/3/2020   Trials Left Right   1 11 11     Palpation  Pain Report:  - none  + mild    ++ moderate    +++ severe   Long Finger 11/3/2020   A1 Pulley +   A3 Pulley/PIP -     Observation  [place] and [place]ely nodes bilaterally.    Assessment:  Patient presents with symptoms consistent with diagnosis of the above conditoin,  with conservative intervention.     Patient's limitations or Problem List includes:  Pain, Decreased ROM/motion, Increased edema, Decreased  and Decreased pinch of the bilateral hand which interferes with the patient's ability to perform Self Care Tasks (dressing, eating, bathing, hygiene/toileting), Sleep Patterns, Recreational Activities, Household Chores and Driving  as compared to previous level of function.    Rehab Potential:  Good - Return to full activity, some limitations    Patient will benefit from skilled Occupational Therapy to increase ROM,  strength and pinch strength and decrease pain and triggering to return to previous activity level and resume normal daily tasks and to reach their rehab potential.    Barriers to Learning:  No barrier    Communication Issues:  Patient appears to be able to clearly communicate and understand verbal and written communication and follow directions correctly.    Chart Review: Chart Review    Identified Performance Deficits: bathing/showering, toileting, dressing, feeding, hygiene and grooming, driving and community mobility, home establishment and management, meal preparation and cleanup, shopping and leisure activities    Assessment of Occupational Performance:  1-3 Performance Deficits    Clinical Decision Making (Complexity): Low  complexity    Treatment Explanation:  The following has been discussed with the patient:  RX ordered/plan of care  Anticipated outcomes  Possible risks and side effects    Plan:  Frequency:  1 X week, once daily  Duration:  for 6 weeks    Treatment Plan:   Modalities:  US  Therapeutic Exercise:  AROM, PROM, Tendon Gliding, Blocking, Isotonics and Isometrics  Manual Techniques:  Friction massage and Myofascial release  Orthotic Fabrication:  Static orthosis  Self Care:  Ergonomic Considerations and Adaptive Equipment Education    Discharge Plan:  Achieve all LTG.  Independent in home treatment program.  Reach maximal therapeutic benefit.    Home Exercise Program:  Trigger finger orthosis  Finger PROM  Warmth    Next Visit:  Fabricate resting hand orthoses  Tendon gliding  MFR  Adaptive Equipment Education

## 2020-11-11 ENCOUNTER — THERAPY VISIT (OUTPATIENT)
Dept: OCCUPATIONAL THERAPY | Facility: CLINIC | Age: 60
End: 2020-11-11
Payer: COMMERCIAL

## 2020-11-11 DIAGNOSIS — M79.641 BILATERAL HAND PAIN: Primary | ICD-10-CM

## 2020-11-11 DIAGNOSIS — M79.642 BILATERAL HAND PAIN: Primary | ICD-10-CM

## 2020-11-11 PROCEDURE — 97140 MANUAL THERAPY 1/> REGIONS: CPT | Mod: GO | Performed by: OCCUPATIONAL THERAPIST

## 2020-11-11 PROCEDURE — 97110 THERAPEUTIC EXERCISES: CPT | Mod: GO | Performed by: OCCUPATIONAL THERAPIST

## 2020-11-16 ENCOUNTER — THERAPY VISIT (OUTPATIENT)
Dept: OCCUPATIONAL THERAPY | Facility: CLINIC | Age: 60
End: 2020-11-16
Payer: COMMERCIAL

## 2020-11-16 DIAGNOSIS — M79.641 BILATERAL HAND PAIN: Primary | ICD-10-CM

## 2020-11-16 DIAGNOSIS — M79.642 BILATERAL HAND PAIN: Primary | ICD-10-CM

## 2020-11-16 PROCEDURE — 97110 THERAPEUTIC EXERCISES: CPT | Mod: GO | Performed by: OCCUPATIONAL THERAPIST

## 2020-11-16 PROCEDURE — 97140 MANUAL THERAPY 1/> REGIONS: CPT | Mod: GO | Performed by: OCCUPATIONAL THERAPIST

## 2020-11-16 PROCEDURE — 97035 APP MDLTY 1+ULTRASOUND EA 15: CPT | Mod: GO | Performed by: OCCUPATIONAL THERAPIST

## 2020-11-23 ENCOUNTER — THERAPY VISIT (OUTPATIENT)
Dept: OCCUPATIONAL THERAPY | Facility: CLINIC | Age: 60
End: 2020-11-23
Payer: COMMERCIAL

## 2020-11-23 DIAGNOSIS — M79.642 BILATERAL HAND PAIN: Primary | ICD-10-CM

## 2020-11-23 DIAGNOSIS — M79.641 BILATERAL HAND PAIN: Primary | ICD-10-CM

## 2020-11-23 PROCEDURE — 97140 MANUAL THERAPY 1/> REGIONS: CPT | Mod: GO | Performed by: OCCUPATIONAL THERAPIST

## 2020-11-23 PROCEDURE — 97535 SELF CARE MNGMENT TRAINING: CPT | Mod: GO | Performed by: OCCUPATIONAL THERAPIST

## 2021-01-15 ENCOUNTER — HEALTH MAINTENANCE LETTER (OUTPATIENT)
Age: 61
End: 2021-01-15

## 2021-03-24 ENCOUNTER — IMMUNIZATION (OUTPATIENT)
Dept: NURSING | Facility: CLINIC | Age: 61
End: 2021-03-24

## 2021-03-24 PROCEDURE — 0001A PR COVID VAC PFIZER DIL RECON 30 MCG/0.3 ML IM: CPT

## 2021-03-24 PROCEDURE — 91300 PR COVID VAC PFIZER DIL RECON 30 MCG/0.3 ML IM: CPT

## 2021-04-14 ENCOUNTER — OFFICE VISIT (OUTPATIENT)
Dept: NURSING | Facility: CLINIC | Age: 61
End: 2021-04-14
Attending: FAMILY MEDICINE
Payer: COMMERCIAL

## 2021-04-14 PROCEDURE — 91300 PR COVID VAC PFIZER DIL RECON 30 MCG/0.3 ML IM: CPT

## 2021-04-14 PROCEDURE — 0002A PR COVID VAC PFIZER DIL RECON 30 MCG/0.3 ML IM: CPT

## 2021-04-24 ENCOUNTER — HEALTH MAINTENANCE LETTER (OUTPATIENT)
Age: 61
End: 2021-04-24

## 2021-08-08 ENCOUNTER — HEALTH MAINTENANCE LETTER (OUTPATIENT)
Age: 61
End: 2021-08-08

## 2021-10-04 ENCOUNTER — HEALTH MAINTENANCE LETTER (OUTPATIENT)
Age: 61
End: 2021-10-04

## 2021-11-17 PROBLEM — M79.642 BILATERAL HAND PAIN: Status: RESOLVED | Noted: 2018-08-01 | Resolved: 2021-11-17

## 2021-11-17 PROBLEM — M79.641 BILATERAL HAND PAIN: Status: RESOLVED | Noted: 2018-08-01 | Resolved: 2021-11-17

## 2022-05-15 ENCOUNTER — HEALTH MAINTENANCE LETTER (OUTPATIENT)
Age: 62
End: 2022-05-15

## 2022-09-11 ENCOUNTER — HEALTH MAINTENANCE LETTER (OUTPATIENT)
Age: 62
End: 2022-09-11

## 2023-06-03 ENCOUNTER — HEALTH MAINTENANCE LETTER (OUTPATIENT)
Age: 63
End: 2023-06-03

## 2023-10-07 ENCOUNTER — HEALTH MAINTENANCE LETTER (OUTPATIENT)
Age: 63
End: 2023-10-07

## (undated) RX ORDER — DEXAMETHASONE SODIUM PHOSPHATE 4 MG/ML
INJECTION, SOLUTION INTRA-ARTICULAR; INTRALESIONAL; INTRAMUSCULAR; INTRAVENOUS; SOFT TISSUE
Status: DISPENSED
Start: 2018-06-20

## (undated) RX ORDER — LIDOCAINE HYDROCHLORIDE 10 MG/ML
INJECTION, SOLUTION EPIDURAL; INFILTRATION; INTRACAUDAL; PERINEURAL
Status: DISPENSED
Start: 2018-06-20